# Patient Record
Sex: MALE | Race: WHITE | Employment: OTHER | URBAN - METROPOLITAN AREA
[De-identification: names, ages, dates, MRNs, and addresses within clinical notes are randomized per-mention and may not be internally consistent; named-entity substitution may affect disease eponyms.]

---

## 2018-12-03 ENCOUNTER — HOSPITAL ENCOUNTER (INPATIENT)
Facility: HOSPITAL | Age: 78
LOS: 3 days | Discharge: NURSING, REHAB OR LONG-TERM CARE | DRG: 683 | End: 2018-12-07
Attending: EMERGENCY MEDICINE | Admitting: INTERNAL MEDICINE
Payer: MEDICARE

## 2018-12-03 ENCOUNTER — APPOINTMENT (OUTPATIENT)
Dept: GENERAL RADIOLOGY | Facility: HOSPITAL | Age: 78
DRG: 683 | End: 2018-12-03
Payer: MEDICARE

## 2018-12-03 DIAGNOSIS — L02.212 ABSCESS OF BACK: ICD-10-CM

## 2018-12-03 DIAGNOSIS — N17.9 AKI (ACUTE KIDNEY INJURY) (HCC): Primary | ICD-10-CM

## 2018-12-03 DIAGNOSIS — E86.0 DEHYDRATION: ICD-10-CM

## 2018-12-03 DIAGNOSIS — E87.0 HYPERNATREMIA: ICD-10-CM

## 2018-12-03 PROCEDURE — 71045 X-RAY EXAM CHEST 1 VIEW: CPT | Performed by: EMERGENCY MEDICINE

## 2018-12-04 PROBLEM — L02.212 ABSCESS OF BACK: Status: ACTIVE | Noted: 2018-12-04

## 2018-12-04 PROBLEM — E86.0 DEHYDRATION: Status: ACTIVE | Noted: 2018-12-04

## 2018-12-04 PROBLEM — N17.9 AKI (ACUTE KIDNEY INJURY) (HCC): Status: ACTIVE | Noted: 2018-12-04

## 2018-12-04 PROBLEM — E87.0 HYPERNATREMIA: Status: ACTIVE | Noted: 2018-12-04

## 2018-12-04 PROCEDURE — 0W9K0ZZ DRAINAGE OF UPPER BACK, OPEN APPROACH: ICD-10-PCS | Performed by: EMERGENCY MEDICINE

## 2018-12-04 PROCEDURE — 99223 1ST HOSP IP/OBS HIGH 75: CPT | Performed by: INTERNAL MEDICINE

## 2018-12-04 RX ORDER — ATORVASTATIN CALCIUM 10 MG/1
10 TABLET, FILM COATED ORAL NIGHTLY
Status: DISCONTINUED | OUTPATIENT
Start: 2018-12-04 | End: 2018-12-07

## 2018-12-04 RX ORDER — FAMOTIDINE 40 MG/1
40 TABLET, FILM COATED ORAL DAILY
Status: DISCONTINUED | OUTPATIENT
Start: 2018-12-04 | End: 2018-12-07

## 2018-12-04 RX ORDER — GLIMEPIRIDE 1 MG/1
1 TABLET ORAL
COMMUNITY

## 2018-12-04 RX ORDER — SERTRALINE HYDROCHLORIDE 25 MG/1
25 TABLET, FILM COATED ORAL DAILY
Status: DISCONTINUED | OUTPATIENT
Start: 2018-12-04 | End: 2018-12-07

## 2018-12-04 RX ORDER — SODIUM CHLORIDE 9 MG/ML
INJECTION, SOLUTION INTRAVENOUS CONTINUOUS
Status: ACTIVE | OUTPATIENT
Start: 2018-12-04 | End: 2018-12-04

## 2018-12-04 RX ORDER — SODIUM CHLORIDE 9 MG/ML
INJECTION, SOLUTION INTRAVENOUS CONTINUOUS
Status: DISCONTINUED | OUTPATIENT
Start: 2018-12-04 | End: 2018-12-04

## 2018-12-04 RX ORDER — SERTRALINE HYDROCHLORIDE 25 MG/1
25 TABLET, FILM COATED ORAL DAILY
COMMUNITY

## 2018-12-04 RX ORDER — METHYLPHENIDATE HYDROCHLORIDE 5 MG/1
2.5 TABLET ORAL DAILY
Status: DISCONTINUED | OUTPATIENT
Start: 2018-12-04 | End: 2018-12-07

## 2018-12-04 RX ORDER — DEXTROSE MONOHYDRATE 25 G/50ML
50 INJECTION, SOLUTION INTRAVENOUS AS NEEDED
Status: DISCONTINUED | OUTPATIENT
Start: 2018-12-04 | End: 2018-12-07

## 2018-12-04 RX ORDER — LISINOPRIL 10 MG/1
10 TABLET ORAL DAILY
Status: DISCONTINUED | OUTPATIENT
Start: 2018-12-04 | End: 2018-12-07

## 2018-12-04 RX ORDER — ONDANSETRON 2 MG/ML
4 INJECTION INTRAMUSCULAR; INTRAVENOUS EVERY 4 HOURS PRN
Status: DISCONTINUED | OUTPATIENT
Start: 2018-12-04 | End: 2018-12-07

## 2018-12-04 RX ORDER — DEXTROSE AND SODIUM CHLORIDE 5; .45 G/100ML; G/100ML
INJECTION, SOLUTION INTRAVENOUS CONTINUOUS
Status: DISCONTINUED | OUTPATIENT
Start: 2018-12-04 | End: 2018-12-04

## 2018-12-04 RX ORDER — SODIUM CHLORIDE 450 MG/100ML
INJECTION, SOLUTION INTRAVENOUS CONTINUOUS
Status: DISCONTINUED | OUTPATIENT
Start: 2018-12-04 | End: 2018-12-07

## 2018-12-04 RX ORDER — 0.9 % SODIUM CHLORIDE 0.9 %
VIAL (ML) INJECTION
Status: COMPLETED
Start: 2018-12-04 | End: 2018-12-04

## 2018-12-04 RX ORDER — METHYLPHENIDATE HYDROCHLORIDE 5 MG/1
2.5 TABLET ORAL DAILY
Status: ON HOLD | COMMUNITY
End: 2019-03-28

## 2018-12-04 RX ORDER — ONDANSETRON 2 MG/ML
4 INJECTION INTRAMUSCULAR; INTRAVENOUS EVERY 6 HOURS PRN
Status: DISCONTINUED | OUTPATIENT
Start: 2018-12-04 | End: 2018-12-07

## 2018-12-04 RX ORDER — SIMVASTATIN 20 MG
20 TABLET ORAL NIGHTLY
COMMUNITY

## 2018-12-04 RX ORDER — LISINOPRIL 10 MG/1
10 TABLET ORAL DAILY
COMMUNITY

## 2018-12-04 RX ORDER — ALFUZOSIN HYDROCHLORIDE 10 MG/1
10 TABLET, EXTENDED RELEASE ORAL DAILY
Status: DISCONTINUED | OUTPATIENT
Start: 2018-12-04 | End: 2018-12-07

## 2018-12-04 RX ORDER — HEPARIN SODIUM 5000 [USP'U]/ML
5000 INJECTION, SOLUTION INTRAVENOUS; SUBCUTANEOUS EVERY 12 HOURS SCHEDULED
Status: DISCONTINUED | OUTPATIENT
Start: 2018-12-04 | End: 2018-12-07

## 2018-12-04 RX ORDER — TAMSULOSIN HYDROCHLORIDE 0.4 MG/1
0.4 CAPSULE ORAL DAILY
COMMUNITY

## 2018-12-04 RX ORDER — ACETAMINOPHEN 325 MG/1
650 TABLET ORAL EVERY 6 HOURS PRN
Status: DISCONTINUED | OUTPATIENT
Start: 2018-12-04 | End: 2018-12-07

## 2018-12-04 RX ORDER — FAMOTIDINE 40 MG/1
40 TABLET, FILM COATED ORAL DAILY
COMMUNITY

## 2018-12-04 NOTE — ED NOTES
Patient's wife PAULINA EVANS, [phone], left this RN will call and leave message with room number. Per wife patient medications are at [facility], [address]

## 2018-12-04 NOTE — PROGRESS NOTES
[facility] Pharmacy Note:  Therapeutic Interchange    Lucie Becker was previously taking tamsulosin 0.4 mg PO q24h at home prior to admission. Per therapeutic interchange, the patient will be switched to alfuzosin 10 mg PO q24h during hospitalization.     Ginette Deluca

## 2018-12-04 NOTE — ED NOTES
Patient's family states patient has been weak for the past week, not being able to change his own depends, not being able to transfer into/out of wheelchair. Loss of appetite. Patient has hx of previous stroke and right sided weakness.

## 2018-12-04 NOTE — ED PROVIDER NOTES
Patient Seen in: [facility] AND [facility] Emergency Department    History   Patient presents with:  Dehydration (metabolic/constitutional)      HPI    Patient presents to the ED with his wife for poor p.o. intake for the past 2 weeks.   Wife states patient has n Left eye exhibits no discharge. Neck: No tracheal deviation present. Cardiovascular: Normal rate and intact distal pulses. Pulmonary/Chest: Effort normal and breath sounds normal. No stridor. No respiratory distress. Abdominal: Soft.  He exhibits no Abnormality         Status                     ---------                               -----------         ------                     CBC W/ DIFFERENTIAL[[id number]]          Abnormal            Final result                 Please view resu abscess that was drained by myself, patient will need inpatient care for ongoing symptoms. Discussed with Dr. Hayes Leslie for admission and Dr. Wesly Henley for renal consult. Procedure:  Abscess drainage:   The patient's abscess was located to his central upper

## 2018-12-04 NOTE — PROGRESS NOTES
[facility] Pharmacy Note:  Therapeutic Interchange    Santiago Mckee was previously taking simvastatin 20 mg PO q24h at home prior to admission. Per therapeutic interchange, the patient will be switched to atorvastatin 10 mg PO q24h during hospitalization.

## 2018-12-04 NOTE — SLP NOTE
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 Per Pt, currently assisted with meals at home of \"softer foods\" and thin liquids with no straw use.  Per Pt, no swallowin be to ensure safe tolerance of NEWLY initiated diet and to reinforce all swallowing precautions/strategies to improve safety/efficiency of the swallow. CXR results to be monitored closely.  Will complete VFSS as appropriate (if CXR declines/no improvement a Unlabored  Consistencies Trialed: Thin liquids; Nectar thick liquids; Hard solid  Method of Presentation: Staff/Clinician assistance;Cup  Patient Positioning: Upright;Midline    Oral Phase of Swallow: Impaired  Bolus Retrieval: Intact  Bilabial Seal: Intact

## 2018-12-04 NOTE — ED NOTES
Pt from home, difficulty ambulating, decreased appetite, patient normally incontinent. Hx cva (right side residual). Wife at bedside. Fluids running. Vitals up to date. Patient answering this RN's questions appropriately such as birthday and year.

## 2018-12-04 NOTE — ED NOTES
Patient answering questions appropriately. Per wife he hasnt been eating as much lately and also she hasnt been giving his metformin since he does not like the taste.

## 2018-12-04 NOTE — ED INITIAL ASSESSMENT (HPI)
Family reports pt has had poor PO intake x 2 weeks and has not been eating or drinking. Pt has hx of CVA with right sided residual weakness. Pt denies cp or dizziness.  +SOB

## 2018-12-04 NOTE — H&P
[facility]    History and Physical    [address] Patient Status:  Inpatient    10/27/1940 MRN [id number]   Location [facility] 5SW/SE Attending Jose D Flores MD   Hosp Day # 0 PCP No primary care provider on file.      Mike Parikh mouth daily. Review of Systems:     Constitutional: Negative. HENT: Negative. Eyes: Negative. Respiratory: Negative. Cardiovascular: Negative. Gastrointestinal: Negative. Endocrine: Negative. Genitourinary: Negative.     [address] [facility] USHA (acute kidney injury) started on fluids,  Renal on case  Dehydration on fluids  UTI on abx  DM monitor accucheck  HLD on statin  CVA with h/o weakness  Abscess s/p drainage        PHYSICIAN Certification of Need for Inpatient Hospitalization - Init

## 2018-12-04 NOTE — ED NOTES
Orders for admission, patient is aware of plan, and ready to go upstairs. Any questions, please call ED RN [phone].

## 2018-12-05 ENCOUNTER — APPOINTMENT (OUTPATIENT)
Dept: ULTRASOUND IMAGING | Facility: HOSPITAL | Age: 78
DRG: 683 | End: 2018-12-05
Attending: INTERNAL MEDICINE
Payer: MEDICARE

## 2018-12-05 PROCEDURE — 99233 SBSQ HOSP IP/OBS HIGH 50: CPT | Performed by: INTERNAL MEDICINE

## 2018-12-05 PROCEDURE — 76770 US EXAM ABDO BACK WALL COMP: CPT | Performed by: INTERNAL MEDICINE

## 2018-12-05 RX ORDER — POTASSIUM CHLORIDE 20 MEQ/1
40 TABLET, EXTENDED RELEASE ORAL ONCE
Status: DISCONTINUED | OUTPATIENT
Start: 2018-12-05 | End: 2018-12-05

## 2018-12-05 RX ORDER — ARIPIPRAZOLE 15 MG/1
40 TABLET ORAL ONCE
Status: COMPLETED | OUTPATIENT
Start: 2018-12-05 | End: 2018-12-06

## 2018-12-05 NOTE — PLAN OF CARE
Problem: Patient Centered Care  Goal: Patient preferences are identified and integrated in the patient's plan of care  Interventions:  - What would you like us to know as we care for you?  I live with my wife  - Provide timely, complete, and accurate inform

## 2018-12-05 NOTE — CONSULTS
[facility]    Report of Consultation    Raymundo Dawkins Patient Status:  Inpatient    10/27/1940 MRN [id number]   Location [facility] 5SW/SE Attending Vern Cortes MD   Hosp Day # 1 PCP No primary care provider on file. medical: Not on file      Transportation needs - non-medical: Not on file    Occupational History      Not on file    Tobacco Use      Smoking status: Never Smoker      Smokeless tobacco: Never Used    Substance and Sexual Activity      Alcohol use:  No daily.   simvastatin 20 MG Oral Tab Take 20 mg by mouth nightly. famotidine 40 MG Oral Tab Take 40 mg by mouth daily. tamsulosin HCl 0.4 MG Oral Cap Take 0.4 mg by mouth daily. Methylphenidate HCl 5 MG Oral Tab Take 2.5 mg by mouth daily.        Chayito Shaffer sounds normal   Extremities: extremities normal, no edema  Pulses: pedal pulses palpable  Skin: No rashes or lesions  Lymph nodes: Cervical, supraclavicular normal.  Neurologic:R hemiparesis. Austin Master  Speech garbled  Psychiatric: calm    Results:     Laboratory Da

## 2018-12-05 NOTE — DIETARY NOTE
ADULT NUTRITION INITIAL ASSESSMENT    Pt is at high nutrition risk. Pt meets malnutrition criteria.       CRITERIA FOR MALNUTRITION DIAGNOSIS:  Criteria for severe malnutrition diagnosis: chronic illness related to wt loss greater than 20% in 1 year, energ with ordering meals, tray set up and/or feeding as needed  - Meals and snacks: see RD malnutrition care plan above  - Medical Food Supplements-RD added ONS TID to provide 790 kcal and 27 g protein daily. Rational/use of oral supplements discussed.   - Vitam noted IV fluids 0.45 NS provides 2L fluids daily.    • famotidine  40 mg Oral Daily   • lisinopril  10 mg Oral Daily   • Methylphenidate HCl  2.5 mg Oral Daily   • Sertraline HCl  25 mg Oral Daily   • atorvastatin  10 mg Oral Nightly   • Alfuzosin HCl ER  1 Anthropometric Measurement:      Monitor: wt and wt change  - Nutrition Goals:      gradual wt gain as able, PO greater than 50% of meals, good supplement intake, euglycemia, prevent skin breakdown, support wound healing and labs WNL (K, P, Mg- refeeding r

## 2018-12-05 NOTE — SLP NOTE
SPEECH DAILY NOTE - INPATIENT    ASSESSMENT & PLAN   ASSESSMENT  Pt seen for ongoing dysphagia therapy per recommendations of BSSE on 12/4/18. No family present in the room.  RN reports pt tolerates medication crushed in pureed, mechanical soft solids, and signs or symptoms of aspiration with 100 % accuracy over 2 session(s).    Pt tolerates mechanical soft solids with no overt CSA for 100% of trials. Pt with overt CSA with NTL via cup. Trials d/c.  Pt tolerates nectar thickened liquids via TSP with no overt

## 2018-12-05 NOTE — PHYSICAL THERAPY NOTE
PHYSICAL THERAPY EVALUATION - INPATIENT     Room Number: 548/548-A  Evaluation Date: 12/5/2018  Type of Evaluation: Initial   Physician Order: PT Eval and Treat    Presenting Problem: Dehydration, weakness, USHA  Reason for Therapy: Mobility Dysfunction an training;Transfer training;Balance training  Rehab Potential : Fair  Frequency (Obs): 5x/week       PHYSICAL THERAPY MEDICAL/SOCIAL HISTORY     History related to current admission:  Patient presents with:  Dehydration (metabolic/constitutional)     77 yea risk    WEIGHT BEARING RESTRICTION; None        PAIN ASSESSMENT  Ratin          COGNITION  · Overall Cognitive Status:  WFL - within functional limits    RANGE OF MOTION AND STRENGTH ASSESSMENT  Upper extremity ROM and strength are within functional li functional able to care for me better with my wife   Goal #1 Patient is able to demonstrate supine - sit EOB @ level: minimum assistance     Goal #1   Current Status    Goal #2 Patient is able to demonstrate transfers Sit to/from Stand at assistance level:

## 2018-12-05 NOTE — OCCUPATIONAL THERAPY NOTE
OCCUPATIONAL THERAPY EVALUATION - INPATIENT     Room Number: 548/548-A  Evaluation Date: 12/5/2018  Type of Evaluation: Initial  Presenting Problem: (weakness, poor intake )    Physician Order: IP Consult to Occupational Therapy  Reason for Therapy: ADL/IA Pt is below baseline = 2 person transfer, max for lb adls, poor sitting and standing balance, fall risk, incontinent, back abscess and will require MAMADOU at discharge to maximize ability for him to manage at home with wife - alarm set on chair - RN aware and not in the best health\"     OCCUPATIONAL THERAPY EXAMINATION      OBJECTIVE  Precautions: (right maldonado)       PAIN ASSESSMENT  Rating: (no pain noted )          ACTIVITY TOLERANCE  Poor  Poor food intake/weakness               COGNITION  Alert and O x 2  I ASSESSMENT  Grooming: min a   Feeding: min a   Bathing: max a   Toileting: max a - pt is incontinent   Upper Extremity Dressing: NT  Lower Extremity Dressing: max a       Patient End of Session: Up in chair;Needs met;Call light within reach;RN aware of ses

## 2018-12-06 ENCOUNTER — TELEPHONE (OUTPATIENT)
Dept: NEPHROLOGY | Facility: CLINIC | Age: 78
End: 2018-12-06

## 2018-12-06 PROCEDURE — 99232 SBSQ HOSP IP/OBS MODERATE 35: CPT | Performed by: INTERNAL MEDICINE

## 2018-12-06 NOTE — PLAN OF CARE
Diabetes/Glucose Control    • Glucose maintained within prescribed range Progressing        PAIN - ADULT    • Verbalizes/displays adequate comfort level or patient's stated pain goal Progressing        Patient Centered Care    • Patient preferences are erica

## 2018-12-06 NOTE — CM/SW NOTE
12/06/18 1500   CM/SW Referral Data   Referral Source Physician   Reason for Referral Discharge planning   Social History   Recreational Drug/Alcohol Use (History of TIA and Multiple stroke in 204, and 2008)   Patient Info   Advanced directives?  No   Pa

## 2018-12-06 NOTE — PROGRESS NOTES
[facility] - [facility]    Progress Note    [address] Patient Status:  Inpatient    10/27/1940 MRN [id number]   Location [facility] 5SW/SE Attending Sylvia Munoz MD   [facility] Day # 1 PCP No primary care provider on file.        Subject present, no abnormal bruising noted  Back/Spine: no abnormalities noted  Musculoskeletal: full ROM all extremities good strength  no deformities  Extremities: ok  Neurological:  r hemiparesis    Results:     Laboratory Data:  Lab Results   Component Value

## 2018-12-06 NOTE — PHYSICAL THERAPY NOTE
PHYSICAL THERAPY TREATMENT NOTE - INPATIENT     Room Number: 548/548-A       Presenting Problem: weakness, dehydration->UTI    Problem List  Principal Problem:    USHA (acute kidney injury) ([facility] [address]  Active Problems:    Dehydration    Abscess of back    Hypern Static Sitting: Fair -  Dynamic Sitting: Poor +           Static Standing: Poor -  Dynamic Standing: Dependent    ACTIVITY TOLERANCE                         O2 WALK                  AM-PAC support R UE   Goal #2  Current Status Mod A of 2   Goal #3 Patient is able to ambulate 3 feet with assist device: walker - rolling Platform support R UE at assistance level: maximum assistance   Goal #3   Current Status Unable to successfully ambulate;  Sandi Lewis

## 2018-12-06 NOTE — PAYOR COMM NOTE
--------------  ADMISSION REVIEW     Payor: [facility] [place] #:  [id number]  Authorization Number: [id number]    Admit date: 12/4/18  Admit time: 0423         Patient Seen in: [facility] Emergency Department    History   Patient draining. No surrounding erythema. Psychiatric: He has a normal mood and affect. His behavior is normal.   Nursing note and vitals reviewed.       ED Course        Labs Reviewed   BASIC METABOLIC PANEL (8) - Abnormal; Notable for the following components of bleeding, pain and worsening of the condition. The abscess was already spontaneously draining centrally, area enlarged with 11 blade scalpel, a large amount of purulent drainage was expressed. I irrigated and dressed the wound.   The patient tolerated height 5' 6\" (1.676 m), weight 117 lb 9.6 oz (53.3 kg), SpO2 97 %. Nursing note and vitals reviewed. Constitutional: He appears well-developed. HENT:   Head: Normocephalic. Eyes: Pupils are equal, round, and reactive to light.    Neck: Normal rang

## 2018-12-07 VITALS
RESPIRATION RATE: 16 BRPM | DIASTOLIC BLOOD PRESSURE: 40 MMHG | OXYGEN SATURATION: 94 % | BODY MASS INDEX: 19.71 KG/M2 | HEIGHT: 66 IN | WEIGHT: 122.63 LBS | SYSTOLIC BLOOD PRESSURE: 100 MMHG | TEMPERATURE: 98 F | HEART RATE: 75 BPM

## 2018-12-07 PROBLEM — N17.9 AKI (ACUTE KIDNEY INJURY) (HCC): Status: RESOLVED | Noted: 2018-12-04 | Resolved: 2018-12-07

## 2018-12-07 PROBLEM — E87.0 HYPERNATREMIA: Status: RESOLVED | Noted: 2018-12-04 | Resolved: 2018-12-07

## 2018-12-07 PROBLEM — E86.0 DEHYDRATION: Status: RESOLVED | Noted: 2018-12-04 | Resolved: 2018-12-07

## 2018-12-07 PROBLEM — L02.212 ABSCESS OF BACK: Status: RESOLVED | Noted: 2018-12-04 | Resolved: 2018-12-07

## 2018-12-07 PROCEDURE — 99232 SBSQ HOSP IP/OBS MODERATE 35: CPT | Performed by: INTERNAL MEDICINE

## 2018-12-07 RX ORDER — MAGNESIUM SULFATE HEPTAHYDRATE 40 MG/ML
2 INJECTION, SOLUTION INTRAVENOUS ONCE
Status: COMPLETED | OUTPATIENT
Start: 2018-12-07 | End: 2018-12-07

## 2018-12-07 NOTE — SLP NOTE
SPEECH DAILY NOTE - INPATIENT    ASSESSMENT & PLAN   ASSESSMENT  Patient seen in f/u for tolerance of dysphagia diet. RN reports patient adhering to tsp intakes of nectar-thick liquids with breakfast this a.m. Patient was repositioned upright.   Patient rate;Alternate consistencies;Small bites and sips  Aspiration Precautions: Upright position; Slow rate;Small bites and sips; No straw  Medication Administration Recommendations: One pill at a time; Whole in puree    Patient Experiencing Pain: No        Discha  In Progress        FOLLOW UP  Follow Up Needed: Yes - VFSS  SLP Follow-up Date: 12/08/18  Number of Visits to Meet Established Goals: 4    Session: 3 following BSSE    If you have any questions, please contact Nic Pan

## 2018-12-07 NOTE — CM/SW NOTE
12/07/18 1500   Discharge disposition   Expected discharge disposition Skilled Nurs   Name of [address]   Patient is Discharged to a [address] Yes   Discharge transportation Other (comment)    Patient to be d

## 2018-12-07 NOTE — CM/SW NOTE
Patients spouse called and asked for change in location for [facility] facility. [facility] prefers [facility]      Nurse to call report mark [phone] ; Ambulance to  patient at 5pm today.     / to remain available

## 2018-12-07 NOTE — PLAN OF CARE
Problem: Patient Centered Care  Goal: Patient preferences are identified and integrated in the patient's plan of care  Interventions:  - What would you like us to know as we care for you?  I live with my wife  - Provide timely, complete, and accurate inform non-pharmacological measures as appropriate and evaluate response  - Consider cultural and social influences on pain and pain management  - Manage/alleviate anxiety  - Utilize distraction and/or relaxation techniques  - Monitor for opioid side effects  - N

## 2018-12-07 NOTE — PAYOR COMM NOTE
12/5/18.          Objective:   Blood pressure 125/64, pulse 66, temperature 97.2 °F (36.2 °C), temperature source Oral, resp. rate 16, height 5' 6\" (1.676 m), weight 117 lb 9.6 oz (53.3 kg), SpO2 97 %.     Nursing note and vitals reviewed.    Constitutiona   WBC 10.8 12/06/2018     HGB 10.4 (L) 12/06/2018     HCT 32.3 (L) 12/06/2018      (L) 12/06/2018     CREATSERUM 1.15 12/06/2018     BUN 57 (H) 12/06/2018      12/06/2018     K 4.4 12/06/2018     K 4.4 12/06/2018      (H) 12/06/2018 Bag (none) Intravenous     12/6/2018 [address]3 [address] Bag (none) Intravenous

## 2018-12-07 NOTE — SLP NOTE
SPEECH DAILY NOTE - INPATIENT    ASSESSMENT & PLAN   ASSESSMENT  Per RN, pt is tolerating current diet without overt clinical signs or symptoms of aspiration. Pt seen sitting upright in bed with dinner tray to monitor tolerance of current diet.  Family pres demonstrate understanding and implementation of aspiration precautions and swallow strategies independently over 4 session(s). SLP reviewed aspiration precautions and swallow strategies with the patient and family.  Family and patient verbalized Linda

## 2018-12-07 NOTE — PROGRESS NOTES
[facility]    Progress Note    [address] Patient Status:  Inpatient    10/27/1940 MRN [id number]   Location [facility] 5SW/SE Attending Elvia Hatch MD   [address] Day # 3 PCP No primary care provider on file.         Subj

## 2018-12-07 NOTE — DISCHARGE SUMMARY
[place] [facility] - [facility]    Discharge Summary    Chrissy Lewis Patient Status:  Inpatient    10/27/1940 MRN [id number]   Location [facility] 5SW/SE Attending Torito Hernandez MD   [address] Day # 3 PCP No primary care provider on file.      Date

## 2018-12-07 NOTE — PROGRESS NOTES
[facility]    Progress Note    [address] Patient Status:  Inpatient    10/27/1940 MRN [id number]   Location [facility] 5SW/SE Attending Arely MD Davon   [facility] Day # 2 PCP No primary care provider on file.        Subject normal  Skin/Hair: no unusual rashes present, no abnormal bruising noted  Back/Spine: no abnormalities noted  Musculoskeletal: full ROM all extremities good strength  no deformities  Extremities: no edema, cyanosis  Neurological:  R hemiparesis    Results:

## 2018-12-08 NOTE — PROGRESS NOTES
[facility]    Progress Note    [address] Patient Status:  Inpatient    10/27/1940 MRN [id number]   Location [facility] 5SW/SE Attending No att. providers found   [address] Day # 3 PCP No primary care provider on file.        Halina Chute normal  Skin/Hair: no unusual rashes present, no abnormal bruising noted  Back/Spine: no abnormalities noted  Musculoskeletal: full ROM all extremities good strength  no deformities  Extremities: no edema, cyanosis  Neurological:  RIGHT HEMIPARESIS  (OLD)

## 2019-03-22 ENCOUNTER — HOSPITAL ENCOUNTER (INPATIENT)
Facility: HOSPITAL | Age: 79
LOS: 6 days | Discharge: NURSING, REHAB OR LONG-TERM CARE | DRG: 683 | End: 2019-03-28
Attending: EMERGENCY MEDICINE | Admitting: HOSPITALIST
Payer: MEDICARE

## 2019-03-22 DIAGNOSIS — N30.01 ACUTE CYSTITIS WITH HEMATURIA: Primary | ICD-10-CM

## 2019-03-22 PROCEDURE — 82962 GLUCOSE BLOOD TEST: CPT

## 2019-03-22 PROCEDURE — 83036 HEMOGLOBIN GLYCOSYLATED A1C: CPT | Performed by: HOSPITALIST

## 2019-03-22 PROCEDURE — 87086 URINE CULTURE/COLONY COUNT: CPT | Performed by: EMERGENCY MEDICINE

## 2019-03-22 PROCEDURE — 80048 BASIC METABOLIC PNL TOTAL CA: CPT | Performed by: EMERGENCY MEDICINE

## 2019-03-22 PROCEDURE — 85025 COMPLETE CBC W/AUTO DIFF WBC: CPT | Performed by: EMERGENCY MEDICINE

## 2019-03-22 PROCEDURE — 96366 THER/PROPH/DIAG IV INF ADDON: CPT

## 2019-03-22 PROCEDURE — 81001 URINALYSIS AUTO W/SCOPE: CPT | Performed by: EMERGENCY MEDICINE

## 2019-03-22 PROCEDURE — 85060 BLOOD SMEAR INTERPRETATION: CPT | Performed by: EMERGENCY MEDICINE

## 2019-03-22 PROCEDURE — 99285 EMERGENCY DEPT VISIT HI MDM: CPT

## 2019-03-22 PROCEDURE — 96365 THER/PROPH/DIAG IV INF INIT: CPT

## 2019-03-22 RX ORDER — ACETAMINOPHEN 325 MG/1
650 TABLET ORAL EVERY 6 HOURS PRN
Status: DISCONTINUED | OUTPATIENT
Start: 2019-03-22 | End: 2019-03-28

## 2019-03-22 RX ORDER — DEXTROSE MONOHYDRATE 25 G/50ML
50 INJECTION, SOLUTION INTRAVENOUS AS NEEDED
Status: DISCONTINUED | OUTPATIENT
Start: 2019-03-22 | End: 2019-03-28

## 2019-03-22 RX ORDER — ONDANSETRON 2 MG/ML
4 INJECTION INTRAMUSCULAR; INTRAVENOUS EVERY 6 HOURS PRN
Status: DISCONTINUED | OUTPATIENT
Start: 2019-03-22 | End: 2019-03-28

## 2019-03-22 RX ORDER — METOCLOPRAMIDE HYDROCHLORIDE 5 MG/ML
10 INJECTION INTRAMUSCULAR; INTRAVENOUS EVERY 8 HOURS PRN
Status: DISCONTINUED | OUTPATIENT
Start: 2019-03-22 | End: 2019-03-28

## 2019-03-22 RX ORDER — SODIUM CHLORIDE 0.9 % (FLUSH) 0.9 %
3 SYRINGE (ML) INJECTION AS NEEDED
Status: DISCONTINUED | OUTPATIENT
Start: 2019-03-22 | End: 2019-03-28

## 2019-03-22 RX ORDER — HEPARIN SODIUM 5000 [USP'U]/ML
5000 INJECTION, SOLUTION INTRAVENOUS; SUBCUTANEOUS EVERY 8 HOURS SCHEDULED
Status: DISCONTINUED | OUTPATIENT
Start: 2019-03-23 | End: 2019-03-28

## 2019-03-23 ENCOUNTER — APPOINTMENT (OUTPATIENT)
Dept: CT IMAGING | Facility: HOSPITAL | Age: 79
DRG: 683 | End: 2019-03-23
Attending: INTERNAL MEDICINE
Payer: MEDICARE

## 2019-03-23 PROCEDURE — 74176 CT ABD & PELVIS W/O CONTRAST: CPT | Performed by: INTERNAL MEDICINE

## 2019-03-23 PROCEDURE — 82962 GLUCOSE BLOOD TEST: CPT

## 2019-03-23 PROCEDURE — 85025 COMPLETE CBC W/AUTO DIFF WBC: CPT | Performed by: HOSPITALIST

## 2019-03-23 PROCEDURE — 80048 BASIC METABOLIC PNL TOTAL CA: CPT | Performed by: HOSPITALIST

## 2019-03-23 RX ORDER — ATORVASTATIN CALCIUM 10 MG/1
10 TABLET, FILM COATED ORAL NIGHTLY
Status: DISCONTINUED | OUTPATIENT
Start: 2019-03-23 | End: 2019-03-28

## 2019-03-23 RX ORDER — 0.9 % SODIUM CHLORIDE 0.9 %
VIAL (ML) INJECTION
Status: COMPLETED
Start: 2019-03-23 | End: 2019-03-23

## 2019-03-23 RX ORDER — SODIUM CHLORIDE 9 MG/ML
INJECTION, SOLUTION INTRAVENOUS
Status: COMPLETED
Start: 2019-03-23 | End: 2019-03-23

## 2019-03-23 RX ORDER — SIMVASTATIN 20 MG
20 TABLET ORAL NIGHTLY
Status: DISCONTINUED | OUTPATIENT
Start: 2019-03-23 | End: 2019-03-23

## 2019-03-23 NOTE — PLAN OF CARE
Problem: SAFETY ADULT - FALL  Goal: Free from fall injury  INTERVENTIONS:  - Assess pt frequently for physical needs  - Identify cognitive and physical deficits and behaviors that affect risk of falls.   - [place] fall precautions as indicated by assessme

## 2019-03-23 NOTE — H&P
[facility] Hospitalist H&P       CC: Patient presents with:  Urinary Symptoms (urologic)     PCP: Lanre Villatoro NP    Date of Admission: 3/22/2019  9:05 PM    ASSESSMENT / PLAN:     Mr. Martinez Cristina is a 69 M with PMH of DM2, HTN, hx stroke with residual R fevers/chills. Has had 2 UTI in the past 6 months. Prior to that was several years before last UTI.  Uses diapers because he cannot get to the bathroom quickly and his wife does not like the smell of the urinals      PMH  Past Medical History:   Diagnosis D kg)   SpO2 95%   BMI 21.95 kg/m²     GEN: elderly male in NAD  HEENT: EOMI, PERRLA  Neck: Supple, no JVD  Pulm: CTAB, no crackles or wheezes  CV: RRR, no murmurs   ABD: Soft, non-tender, non-distended, +BS  MSK: RUE contracted, strength 5/5 LUE, LLE, 2/5 R

## 2019-03-23 NOTE — ED NOTES
Pt presents stating that he has a multi drug resistant UTI. Pt c/o pain with urination and recent antbx use for previous UTI. Pt denies fevers, hematuria, back pain.  Pt mentioned that he had a UA done outpatient and the culture came back today and he was i

## 2019-03-23 NOTE — ED PROVIDER NOTES
Patient Seen in: [facility] AND [facility] Emergency Department    History   Patient presents with:  Urinary Symptoms (urologic)    Stated Complaint: UTI    HPI    80-year-old male with history of diabetes, hypertension, previous stroke with residual right arm d headaches. All other systems reviewed and are negative. Positive for stated complaint: UTI  Other systems are as noted in HPI. Constitutional and vital signs reviewed. All other systems reviewed and negative except as noted above.     Physical PM   Result Value Ref Range    WBC 8.3 4.0 - 11.0 x10(3) uL    RBC 3.92 3.80 - 5.80 x10(6)uL    HGB 11.3 (L) 13.0 - 17.5 g/dL    HCT 36.5 (L) 39.0 - 53.0 %    MCV 93.1 80.0 - 100.0 fL    MCH 28.8 26.0 - 34.0 pg    MCHC 31.0 31.0 - 37.0 g/dL    RDW-SD 50.4 were considered based on the presenting problem including UTI, sepsis, pneumonia, viral syndrome.             Disposition and Plan     Clinical Impression:  Acute cystitis with hematuria  (primary encounter diagnosis)    Disposition:  Admit  3/22/2019 10:02

## 2019-03-23 NOTE — ED NOTES
Orders for admission, patient is aware of plan and ready to go upstairs.  Any questions, please call ED RN Garon Spurling  at extension [phone]

## 2019-03-23 NOTE — CONSULTS
[facility]    PATIENT'S NAME: ELVA EVANS   ATTENDING PHYSICIAN: Ananya Whyte. Belén Ryan MD   CONSULTING PHYSICIAN: Nicole Louis MD   PATIENT ACCOUNT#:   [id number]    LOCATION:  [address] [place] [address] Est #:   [id number]       DATE OF DATA:  Urinalysis, active sediment. Urine culture pending. White count 6.0, hemoglobin 9.9, platelets 333,896. BUN 26 and creatinine 1.17. There apparently is a high eosinophil count on the blood smear.     There is an ultrasound from December 2018 show

## 2019-03-23 NOTE — CONSULTS
Malik Perez is a 69year old male. Patient presents with:  Urinary Symptoms (urologic)      HPI:    >2 weeks dysuria not responsive to augmentin    REVIEW OF SYSTEMS:   A comprehensive 11 point review of systems was completed.   Pertinent positives an  03/23/2019    K 4.3 03/23/2019     03/23/2019    CO2 25.0 03/23/2019    GLU 60 03/23/2019    CA 8.4 03/23/2019        Pending Labs     Order Current Status    URINE CULTURE, ROUTINE In process           Results for orders placed or performed d morphology, including toxic granulation and vacuolization. Red blood cells demonstrate moderate anisopoikilocytosis with frequent acanthocytes. There are no other significant morphologic abnormalities in the other leukocyte subsets or platelets.     [place]iffe SCAN SLIDE   Result Value Ref Range    Slide Review Slide reviewed,morphology review added    POCT GLUCOSE   Result Value Ref Range    POC Glucose  75 70 - 99   POCT GLUCOSE   Result Value Ref Range    POC Glucose  76 70 - 99   POCT GLUCOSE   Result Va Lymphocyte Absolute 1.62 1.00 - 4.00 x10(3) uL    Monocyte Absolute 0.70 0.10 - 1.00 x10(3) uL    Eosinophil Absolute 0.93 (H) 0.00 - 0.70 x10(3) uL    Basophil Absolute 0.08 0.00 - 0.20 x10(3) uL    Immature Granulocyte Absolute 0.01 0.00 - 1.00 x10(3) uL

## 2019-03-24 PROCEDURE — 84550 ASSAY OF BLOOD/URIC ACID: CPT | Performed by: INTERNAL MEDICINE

## 2019-03-24 PROCEDURE — 80053 COMPREHEN METABOLIC PANEL: CPT | Performed by: INTERNAL MEDICINE

## 2019-03-24 PROCEDURE — 82962 GLUCOSE BLOOD TEST: CPT

## 2019-03-24 PROCEDURE — 85025 COMPLETE CBC W/AUTO DIFF WBC: CPT | Performed by: INTERNAL MEDICINE

## 2019-03-24 NOTE — PLAN OF CARE
Problem: Patient Centered Care  Goal: Patient preferences are identified and integrated in the patient's plan of care  Interventions:  - What would you like us to know as we care for you? Live at home with my wife. - Provide timely, complete, and accurate i devices as appropriate  - Consider OT/PT consult to assist with strengthening/mobility  - Encourage toileting schedule  Outcome: Progressing      Problem: RISK FOR INFECTION - ADULT  Goal: Absence of fever/infection during anticipated neutropenic period  I medications  - Encourage mobilization and activity  - Obtain nutritional consult as needed  - Establish a toileting routine/schedule  - Consider collaborating with pharmacy to review patient's medication profile  Outcome: Progressing    Goal: Maintains olga status, including labs, urine output, blood pressure (other measures as available)  - Encourage oral intake as appropriate  - Instruct patient on fluid and nutrition restrictions as appropriate  Outcome: Progressing      Problem: SKIN/TISSUE INTEGRITY - AD

## 2019-03-24 NOTE — PROGRESS NOTES
Naty Bhagat is a 69year old male. Patient presents with:  Urinary Symptoms (urologic)      HPI:    Ongoing dysuria;looks less ill today    REVIEW OF SYSTEMS:   A comprehensive 11 point review of systems was completed.   Pertinent positives and negati 185.0 03/24/2019    CREATSERUM 1.28 03/24/2019    BUN 26 03/24/2019     03/24/2019    K 4.6 03/24/2019     03/24/2019    CO2 22.0 03/24/2019    GLU 80 03/24/2019    CA 8.9 03/24/2019    ALB 2.8 03/24/2019    ALKPHO 56 03/24/2019    BILT 0.4 03/ Eosinophils are increased in number with reactive morphology. Neutrophils are normal in number but demonstrate toxic morphology, including toxic granulation and vacuolization.   Red blood cells demonstrate moderate anisopoikilocytosis with frequent acantho previous RBC morphology.     Platelet Morphology Normal Normal    Acanthocytes, Spur Cells 2+ (A)      Ovalocytes 2+ (A)     SCAN SLIDE   Result Value Ref Range    Slide Review Slide reviewed,morphology review added    PSA SCREEN   Result Value Ref Range Range    POC Glucose  104 (H) 70 - 99   POCT GLUCOSE   Result Value Ref Range    POC Glucose  81 70 - 99   POCT GLUCOSE   Result Value Ref Range    POC Glucose  164 (H) 70 - 99   RAINBOW DRAW BLUE   Result Value Ref Range    Hold Blue Auto Resulted    RAIN 4.00 x10(3) uL    Monocyte Absolute 0.70 0.10 - 1.00 x10(3) uL    Eosinophil Absolute 0.93 (H) 0.00 - 0.70 x10(3) uL    Basophil Absolute 0.08 0.00 - 0.20 x10(3) uL    Immature Granulocyte Absolute 0.01 0.00 - 1.00 x10(3) uL    Neutrophil % 44.1 %    Lymph

## 2019-03-24 NOTE — PLAN OF CARE
Problem: Patient Centered Care  Goal: Patient preferences are identified and integrated in the patient's plan of care  Interventions:  - What would you like us to know as we care for you?  Patient wants to be kept updated on POC  - Provide timely, complete, Provide assistive devices as appropriate  - Consider OT/PT consult to assist with strengthening/mobility  - Encourage toileting schedule  Outcome: Progressing      Problem: RISK FOR INFECTION - ADULT  Goal: Absence of fever/infection during anticipated len effectiveness of GI medications  - Encourage mobilization and activity  - Obtain nutritional consult as needed  - Establish a toileting routine/schedule  - Consider collaborating with pharmacy to review patient's medication profile  Outcome: Progressing for patient's volume status, including labs, urine output, blood pressure (other measures as available)  - Encourage oral intake as appropriate  - Instruct patient on fluid and nutrition restrictions as appropriate  Outcome: Progressing      Problem: SKIN/

## 2019-03-25 PROCEDURE — 80048 BASIC METABOLIC PNL TOTAL CA: CPT | Performed by: INTERNAL MEDICINE

## 2019-03-25 PROCEDURE — 80048 BASIC METABOLIC PNL TOTAL CA: CPT | Performed by: HOSPITALIST

## 2019-03-25 PROCEDURE — 81001 URINALYSIS AUTO W/SCOPE: CPT | Performed by: INTERNAL MEDICINE

## 2019-03-25 PROCEDURE — 85025 COMPLETE CBC W/AUTO DIFF WBC: CPT | Performed by: INTERNAL MEDICINE

## 2019-03-25 PROCEDURE — 82962 GLUCOSE BLOOD TEST: CPT

## 2019-03-25 PROCEDURE — 83690 ASSAY OF LIPASE: CPT | Performed by: INTERNAL MEDICINE

## 2019-03-25 PROCEDURE — 85027 COMPLETE CBC AUTOMATED: CPT | Performed by: HOSPITALIST

## 2019-03-25 NOTE — PROGRESS NOTES
[facility] Hospitalist Progress Note     CC: Hospital Follow up    PCP: Karis Jerome NP       Assessment/Plan:     Principal Problem:    Acute cystitis with hematuria    Mr. Estrada Henry is a 69 M with PMH of DM2, HTN, hx stroke with residual R sided weakne °C)-98 °F (36.7 °C)] 97.4 °F (36.3 °C)  Pulse:  [56-62] 56  Resp:  [16-18] 16  BP: (121-140)/(57-59) 140/57      Intake/Output:    Intake/Output Summary (Last 24 hours) at 3/25/2019 1201  Last data filed at 3/25/2019 1032  Gross per 24 hour   Intake 338 ml head of the pancreas which is probably accentuated by motion artifact although the findings are suggestive of acute pancreatitis. No pancreatic mass, pseudocyst or fluid collection. Correlation with pancreatic enzymes recommended.   No calcifications to s acetaminophen, ondansetron HCl, Metoclopramide HCl, dextrose, Glucose-Vitamin C, glucose

## 2019-03-25 NOTE — PAYOR COMM NOTE
--------------  ADMISSION REVIEW     Payor: [facility] [address] #:  [id number]  Authorization Number: N/A      REVIEW DOCUMENTATION:     ED Provider Notes             Patient Seen in: [facility] Emergency Department    History   Pa dysuria. Negative for flank pain and frequency. Musculoskeletal: Negative for back pain. Skin: Negative for rash. Neurological: Negative for weakness, light-headedness and headaches. All other systems reviewed and are negative.       Positive for st Calculated Osmolality 301 (H) 275 - 295 mOsm/kg    GFR, Non- 50 (L) >=60    GFR, -American 58 (L) >=60   CBC W/ DIFFERENTIAL    Collection Time: 03/22/19  9:24 PM   Result Value Ref Range    WBC 8.3 4.0 - 11.0 x10(3) uL    RBC 3.92 3 house    Patient and/or patient's family given opportunity to ask questions and note understanding and agreeing with therapeutic plan as outlined    Dale Collins MD  [facility] Hospitalist  Answering Service number: [phone]    HPI       History of Prese History    Tobacco Use      Smoking status: Never Smoker      Smokeless tobacco: Never Used    Alcohol use: No      Frequency: Never       Fam Hx  Family History   Problem Relation Age of Onset   • No Known Problems Father    • Other (macular degeneration) Electronically signed by Hernando Brennan MD on 3/23/2019  3:18 PM         MEDICATIONS ADMINISTERED IN LAST 1 DAY:  atorvastatin (LIPITOR) tab 10 mg     Date Action Dose Route User    3/24/2019 2131 Given 10 mg Oral Aroldo Hickey RN      H initiated with meropenem which I am in agreement with, pending a new culture. There is a history of ESBL and this certainly could be an ESBL infection. 2.       There is a history of bladder debris seen on an ultrasound done this past December.   Jg pancreas unremarkable. There is suggestion of mild edema at the pancreatic body and uncinate process. No visualized focal mass or fluid collection. No   significant pancreatic calcifications. ADRENALS:      Right adrenal negative.   Question of left adr approximately 53.4 mL  BONES:             Mild to moderate degenerative spondylosis thoracolumbar spine. No significant bony lesion or fracture.   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 Comparison with any previous outside abdominal CT studies suggested if possible. Appropriate for inpatient status per guidelines for UTI with known multiple resistances needing abx beyond observation period.

## 2019-03-25 NOTE — PLAN OF CARE
Problem: Patient Centered Care  Goal: Patient preferences are identified and integrated in the patient's plan of care  Interventions:  - What would you like us to know as we care for you? Live at home with my wife. - Provide timely, complete, and accurate i Instruct pt to call for assistance with activity based on assessment  - Modify environment to reduce risk of injury  - Provide assistive devices as appropriate  - Consider OT/PT consult to assist with strengthening/mobility  - Encourage toileting schedule nutritional consult as needed  - Evaluate fluid balance  Outcome: Completed Date Met: 03/25/19  No c/o NV; No IVF;  Tolerating PO intake well; BM yesterday   Goal: Maintains or returns to baseline bowel function  INTERVENTIONS:  - Assess bowel function  - M ordered  - Instruct patient on fluid and nutrition restrictions as appropriate  Outcome: Progressing  Monitoring electrolytes per protocol   Goal: Hemodynamic stability and optimal renal function maintained  INTERVENTIONS:  - Monitor labs and assess for si pathologist) if coughing or persistent throat clearing or wet/gurgly vocal quality is noted  Outcome: Progressing  Modified diet in place;  No coughing or signs of asp    Problem: PAIN - ADULT  Goal: Verbalizes/displays adequate comfort level or patient's s

## 2019-03-25 NOTE — PLAN OF CARE
Problem: Patient Centered Care  Goal: Patient preferences are identified and integrated in the patient's plan of care  Interventions:  - What would you like us to know as we care for you? Live at home with my wife. - Provide timely, complete, and accurate i Absence of fever/infection during anticipated neutropenic period  INTERVENTIONS  - Monitor WBC  - Administer growth factors as ordered  - Implement neutropenic guidelines  Outcome: Progressing

## 2019-03-25 NOTE — PROGRESS NOTES
[facility] AND [facility] Infectious Disease  Progress Note    Chrissy Lewis Patient Status:  Inpatient    10/27/1940 MRN [id number]   Location [facility] 5SW/SE Attending Jaspal Mcclendon MD   [facility] Day # 3 PCP GLENN Pereira dilatation. No intrahepatic ductal dilatation. 3. Distended bladder with nonspecific CT appearance. Mild to moderate prostatic enlargement with prostate volume 53.4 mL.   4. Moderate bilateral perinephric stranding consistent with sequela of acute or chr

## 2019-03-26 PROCEDURE — 97530 THERAPEUTIC ACTIVITIES: CPT

## 2019-03-26 PROCEDURE — 82962 GLUCOSE BLOOD TEST: CPT

## 2019-03-26 PROCEDURE — 97162 PT EVAL MOD COMPLEX 30 MIN: CPT

## 2019-03-26 PROCEDURE — 80048 BASIC METABOLIC PNL TOTAL CA: CPT | Performed by: HOSPITALIST

## 2019-03-26 PROCEDURE — 97166 OT EVAL MOD COMPLEX 45 MIN: CPT

## 2019-03-26 RX ORDER — ALFUZOSIN HYDROCHLORIDE 10 MG/1
10 TABLET, EXTENDED RELEASE ORAL
Status: DISCONTINUED | OUTPATIENT
Start: 2019-03-26 | End: 2019-03-28

## 2019-03-26 NOTE — PHYSICAL THERAPY NOTE
PHYSICAL THERAPY EVALUATION - INPATIENT     Room Number: 555/555-A  Evaluation Date: 3/26/2019  Type of Evaluation: Initial   Physician Order: PT Eval and Treat    Presenting Problem: acute cystitis w/hematuria, pain w/urination  Reason for Therapy: Mobil (therapist being like the R platform attachment) and he is able to take steps to chair.  He reports he is normally independent with bed mobility, sit to stand and transfer to w/c or commode and manages his own clothes as well at home in the morning, he need None    Prior Level of [place]: Patient reports he is normally able to manage his self cares and bed mobility and transfers in the morning.  He does not always put on the AFO but he can manage transfers with his R platform RW and pivot to w/c or commod Location: Right arm  BP Method: Automatic  Patient Position: Sitting    O2 WALK  SPO2 on Room Air at Rest: 96               AM-PAC '6-Clicks' [address] [place]  How much difficulty does the patient currently have. ..  -   Turning over in training    Patient End of Session: Up in chair;Needs met;Call light within reach;RN aware of session/findings; All patient questions and concerns addressed; Alarm set; Family present    CURRENT GOALS    Goals to be met by: 4/8/19  Patient Goal Patient's self

## 2019-03-26 NOTE — PLAN OF CARE
Problem: Diabetes/Glucose Control  Goal: Glucose maintained within prescribed range  INTERVENTIONS:  - Monitor Blood Glucose as ordered  - Assess for signs and symptoms of hyperglycemia and hypoglycemia  - Administer ordered medications to maintain glucose partner in discharge planning  - Arrange for needed discharge resources and transportation as appropriate  - Identify discharge learning needs (meds, wound care, etc)  - Arrange for interpreters to assist at discharge as needed  - Consider post-discharge p including labs, urine output, blood pressure (other measures as available)  - Encourage oral intake as appropriate  - Instruct patient on fluid and nutrition restrictions as appropriate  Outcome: Progressing      Problem: SKIN/TISSUE [address] cultural and social influences on pain and pain management  - Manage/alleviate anxiety  - Utilize distraction and/or relaxation techniques  - Monitor for opioid side effects  - Notify MD/LIP if interventions unsuccessful or patient reports new pain  - Anti

## 2019-03-26 NOTE — PROGRESS NOTES
[facility] Hospitalist Progress Note     CC: Hospital Follow up    PCP: Kwesi Rodriguez NP       Assessment/Plan:     Principal Problem:    Acute cystitis with hematuria    Mr. Neli Parker is a 69 M with PMH of DM2, HTN, hx stroke with residual R sided weakne temperature 97.6 °F (36.4 °C), temperature source Oral, resp. rate 16, height 165.1 cm (5' 5\"), weight 138 lb 9.6 oz (62.9 kg), SpO2 95 %.     Temp:  [97.3 °F (36.3 °C)-98.1 °F (36.7 °C)] 97.6 °F (36.4 °C)  Pulse:  [55-61] 57  Resp:  [16] 16  BP: (130-140) Abdomen+pelvis(cpt=74176)    Result Date: 3/23/2019  CONCLUSION:  1. Study limited by motion artifact.   There is edema at the body and head of the pancreas which is probably accentuated by motion artifact although the findings are suggestive of acute pancr Q12H   • atorvastatin  10 mg Oral Nightly   • Heparin Sodium (Porcine)  5,000 Units Subcutaneous Q8H [facility] & half-way   • Insulin Aspart Pen  1-5 Units Subcutaneous TID CC       Normal Saline Flush, acetaminophen, ondansetron HCl, Metoclopramide HCl, dextrose, Glucose-

## 2019-03-26 NOTE — OCCUPATIONAL THERAPY NOTE
OCCUPATIONAL THERAPY EVALUATION - INPATIENT     Room Number: 555/555-A  Evaluation Date: 3/26/2019  Type of Evaluation: Initial  Presenting Problem: acute cystitis    Physician Order: IP Consult to Occupational Therapy  Reason for Therapy: ADL/IADL Dysfunc (ae,platform walker).  Pending progress anticipate that pt will be able to return home w/ assist and [facility] as before     DISCHARGE RECOMMENDATIONS  OT Discharge Recommendations: Home;24 hour care/supervision;Home with home health PT/OT;Sub-acute rehabilitation STRENGTH ASSESSMENT  Left upper extremity strength is within functional limits;Rue strength is absent    COORDINATION  Gross Motor: WFL Lue  Fine Motor: WFL Lue    ACTIVITIES OF DAILY LIVING ASSESSMENT  AM-PAC ‘6-Clicks’ Inpatient Daily Activity Short

## 2019-03-26 NOTE — PROGRESS NOTES
[facility] AND CLINICS  [facility] Infectious Disease  Progress Note    Papi Lima Patient Status:  Inpatient    10/27/1940 MRN [id number]   Location [facility] 5SW/SE Attending Gisel Lewis MD   [facility] Day # 4 PCP GLENN Thomas appearance.  Mild to moderate prostatic enlargement with prostate volume 53.4 mL.   4. Moderate bilateral perinephric stranding consistent with sequela of acute or chronic inflammation or infection.  Limited noncontrast evaluation for pyelonephritis. [facility] to discharge given patient's debility. Stephanie Frank   [facility] Infectious Disease  [phone]    3/26/2019  2:51 PM

## 2019-03-26 NOTE — CONSULTS
[facility]    Report of Consultation    Magy Hackett Patient Status:  Inpatient    10/27/1940 MRN [id number]   Location [facility] 5SW/SE Attending Zack Clark MD   [address] Day # 4 PCP Mario Espinoza NP     Reason for Co Problems Father    • Other (macular degeneration) Mother    • No Known Problems Daughter    • No Known Problems Son    • Diabetes Sister    • No Known Problems Brother       reports that  has never smoked.  he has never used smokeless tobacco. He reports th (62.9 kg)   SpO2 99%   BMI 23.06 kg/m²   General appearance: alert, appears stated age, cooperative, no distress, resting in bed eating breakfast  Head: Normocephalic, without obvious abnormality, atraumatic  Eyes: conjunctivae and sclerae normal  Ears: sl at the pancreatic body and uncinate process. No visualized focal mass or fluid collection. No   significant pancreatic calcifications. ADRENALS:      Right adrenal negative.   Question of left adrenal fullness or small subcentimeter adrenal mass or nodul degenerative spondylosis thoracolumbar spine. No significant bony lesion or fracture. No acute osseous abnormality  LUNG BASES:  Small bilateral effusions with areas of compressive atelectasis at the lower lung field. Bibasilar atelectasis or scar.   No studies suggested if possible.     Assessment/Plan:  RECURRENT UTI  DYSURIA  Likely multifactorial  Dysuria is improving with abx treatment per patient  Continue abx per ID  Needs outpatient cystoscopy   Had planned to order pyridium but contraindicated wit

## 2019-03-27 ENCOUNTER — TELEPHONE (OUTPATIENT)
Dept: MEDSURG UNIT | Facility: HOSPITAL | Age: 79
End: 2019-03-27

## 2019-03-27 PROCEDURE — 80048 BASIC METABOLIC PNL TOTAL CA: CPT | Performed by: HOSPITALIST

## 2019-03-27 PROCEDURE — 97116 GAIT TRAINING THERAPY: CPT

## 2019-03-27 PROCEDURE — 97530 THERAPEUTIC ACTIVITIES: CPT

## 2019-03-27 PROCEDURE — 97535 SELF CARE MNGMENT TRAINING: CPT

## 2019-03-27 PROCEDURE — 82962 GLUCOSE BLOOD TEST: CPT

## 2019-03-27 PROCEDURE — 97110 THERAPEUTIC EXERCISES: CPT

## 2019-03-27 NOTE — PROGRESS NOTES
[facility] Hospitalist Progress Note     CC: Hospital Follow up    PCP: Klarissa French NP       Assessment/Plan:     Principal Problem:    Acute cystitis with hematuria    Mr. Devora Mejía is a 69 M with PMH of DM2, HTN, hx stroke with residual R sided weakne [phone]     Subjective:     Feels about the same. Wants to go to rehab    OBJECTIVE:    Blood pressure 125/47, pulse 55, temperature 98.8 °F (37.1 °C), temperature source Oral, resp.  rate 18, height 165.1 cm (5' 5\"), weight 134 lb 12.8 oz (61.1 kg), 111*  110*   CO2  23.0  26.0  26.0       Recent Labs   Lab  03/24/19   0656  03/24/19   0815   ALT  8*   --    AST   --   8*   ALB  2.8*   --          Imaging:          Meds:     • Alfuzosin HCl ER  10 mg Oral Daily with breakfast   • Vancomycin HCl  125 m

## 2019-03-27 NOTE — OCCUPATIONAL THERAPY NOTE
OCCUPATIONAL THERAPY TREATMENT NOTE - INPATIENT        Room Number: 555/555-A           Presenting Problem: acute cystitis    Problem List  Principal Problem:    Acute cystitis with hematuria      OCCUPATIONAL THERAPY ASSESSMENT     Pt was seen for OT omar SATURATIONS                ACTIVITIES OF DAILY LIVING ASSESSMENT  AM-PAC ‘6-Clicks’ Inpatient Daily Activity Short Form  How much help from another person does the patient currently need…  -   Putting on and taking off regular lower body clothing?: A Lot

## 2019-03-27 NOTE — PLAN OF CARE
Problem: Patient Centered Care  Goal: Patient preferences are identified and integrated in the patient's plan of care  Interventions:  - What would you like us to know as we care for you? Live at home with my wife. - Provide timely, complete, and accurate i Provide assistive devices as appropriate  - Consider OT/PT consult to assist with strengthening/mobility  - Encourage toileting schedule  Outcome: Progressing  Fall prec in place, pt does not get oob, call light in reach.     Problem: RISK FOR INFECTION - A electrolyte imbalances  - Administer electrolyte replacement as ordered  - Monitor response to electrolyte replacements, including rhythm and repeat lab results as appropriate  - Fluid restriction as ordered  - Instruct patient on fluid and nutrition restr Offer food and liquids at a slow rate  - No straws  - Encourage small bites of food and small sips of liquid  - Offer pills one at a time, crush or deliver with applesauce as needed  - Discontinue feeding and notify MD (or speech pathologist) if coughing o

## 2019-03-27 NOTE — PHYSICAL THERAPY NOTE
PHYSICAL THERAPY TREATMENT NOTE - INPATIENT     Room Number: 555/555-A       Presenting Problem: acute cystitis w/hematuria, pain w/urination    Problem List  Principal Problem:    Acute cystitis with hematuria      PHYSICAL THERAPY ASSESSMENT     Patient +  Dynamic Standing: Poor    ACTIVITY TOLERANCE                         O2 WALK                  AM-PAC '6-Clicks' INPATIENT SHORT FORM - BASIC MOBILITY  How much difficulty does the patient currently have. ..  -   Turning over in bed (including adjusting b chair with R platform RW and R AFO  at assistance level: modified independent   Goal #3   Current Status 3 ft with RW rt side platform with Mod A x 1-2   Goal #4    Goal #4   Current Status    Goal #5 Patient to demonstrate independence with home activity/

## 2019-03-27 NOTE — PROGRESS NOTES
[facility]    Progress Note    [address] [facility] Patient Status:  Inpatient    10/27/1940 MRN [id number]   Location [facility] 5SW/SE Attending Loretta Luevano MD   [facility] Day # 5 PCP Chayo Mcknight NP     Subjective:  Mushtaq Pitt

## 2019-03-27 NOTE — TELEPHONE ENCOUNTER
Patient seen at [place] for retention and recurrent UTI. He will be discharged with Zamorano catheter. Needs cystoscopy/voiding trial with MD next week. Routing to reception to schedule.

## 2019-03-27 NOTE — PLAN OF CARE
Pt seen by PT, recommend therapy , wife prefers a rehab also. Up with 2 assist and special walker to place contracted arm. Pt denies pain. Up to chair. Zamorano cath remains intact.     Diabetes/Glucose Control    • Glucose maintained within prescribed range P

## 2019-03-27 NOTE — PROGRESS NOTES
[facility] AND [facility] Infectious Disease  Progress Note    Shantell Lugo Patient Status:  Inpatient    10/27/1940 MRN [id number]   Location [facility] 5SW/SE Attending Demetrio Givens MD   [facility] Day # 5 PCP GLENN Guillory 53.4 mL.   4. Moderate bilateral perinephric stranding consistent with sequela of acute or chronic inflammation or infection.  Limited noncontrast evaluation for pyelonephritis.  Correlate clinically.  No focal suspicious mass or hydronephrosis.  Bilateral during this admission. Stephanie Chatterjee [facility] Infectious Disease  [phone]    3/27/2019  10:20 AM

## 2019-03-27 NOTE — CM/SW NOTE
SW met w/ pt to discuss d/c planning. Per pt, plan is for him to go to rehab, but unsure of what facility. Pt requested for SW to contact wife/Claudia. SW left VM for wife. SW left SNF list in pt's room. SW contacted [facility] for DON screen.     Jose Johnson L

## 2019-03-28 VITALS
TEMPERATURE: 98 F | HEART RATE: 56 BPM | RESPIRATION RATE: 18 BRPM | BODY MASS INDEX: 22.46 KG/M2 | WEIGHT: 134.81 LBS | OXYGEN SATURATION: 96 % | SYSTOLIC BLOOD PRESSURE: 138 MMHG | HEIGHT: 65 IN | DIASTOLIC BLOOD PRESSURE: 63 MMHG

## 2019-03-28 PROCEDURE — 82962 GLUCOSE BLOOD TEST: CPT

## 2019-03-28 PROCEDURE — 97530 THERAPEUTIC ACTIVITIES: CPT

## 2019-03-28 PROCEDURE — 97110 THERAPEUTIC EXERCISES: CPT

## 2019-03-28 PROCEDURE — 97116 GAIT TRAINING THERAPY: CPT

## 2019-03-28 PROCEDURE — 80048 BASIC METABOLIC PNL TOTAL CA: CPT | Performed by: HOSPITALIST

## 2019-03-28 RX ORDER — CIPROFLOXACIN 500 MG/1
500 TABLET, FILM COATED ORAL 2 TIMES DAILY
Qty: 14 TABLET | Refills: 0 | Status: SHIPPED | OUTPATIENT
Start: 2019-03-28 | End: 2019-04-04

## 2019-03-28 NOTE — PROGRESS NOTES
[facility] Hospitalist Progress Note     CC: Hospital Follow up    PCP: Gil Shearer NP       Assessment/Plan:     Principal Problem:    Acute cystitis with hematuria    Mr. Rachel Woodruff is a 69 M with PMH of DM2, HTN, hx stroke with residual R sided weakne understanding and agreeing with therapeutic plan as outlined  Alyssa Crowder MD  [facility] Hospitalist  Answering Service number: [phone]     Subjective:     No new complaints. Unable to reach wife to get SNF preferences.  Patient has been to [facility] Recent Labs   Lab  03/26/19   0719  03/27/19   0647  03/28/19   0645   GLU  93  103*  97   BUN  29*  32*  31*   CREATSERUM  1.17  1.19  1.21   GFRAA  69  68  66   GFRNAA  60  59*  57*   CA  9.5  8.6  8.9   NA  143  141  142   K  4.5  4.7  4.5   CL

## 2019-03-28 NOTE — PROGRESS NOTES
[facility] AND [facility] Infectious Disease Progress Note    Lucie Becker Patient Status:  Inpatient    10/27/1940 MRN [id number]   [facility] 5SW/SE Attending Jose M Velazquez MD   [facility] Day # 6 PCP Gabbie Silva NP     Subjective rhythm. No murmur. Abdomen:  Soft, non-distended, non-tender, with no rebound or guarding. No peritoneal signs. No ascites. Liver is within normal limits. Spleen is not palpable. Extremities:  No lower extremity edema noted.   Without clubbing or cya

## 2019-03-28 NOTE — PHYSICAL THERAPY NOTE
PHYSICAL THERAPY TREATMENT NOTE - INPATIENT     Room Number: 555/555-A       Presenting Problem: acute cystitis w/hematuria, pain w/urination    Problem List  Principal Problem:    Acute cystitis with hematuria      PHYSICAL THERAPY ASSESSMENT     Patient Sitting: Good  Dynamic Sitting: Fair +           Static Standing: Poor +  Dynamic Standing: Poor    ACTIVITY TOLERANCE                         O2 WALK                  AM-PAC '6-Clicks' INPATIENT SHORT FORM - BASIC MOBILITY  How much difficulty does the pa and R AFO     Goal #2  Current Status Min A x 2   Goal #3 Patient is able to transfer bed to/from chair with R platform RW and R AFO  at assistance level: modified independent   Goal #3   Current Status 10 ft with RW rt side platform with Mod A    Goal #4

## 2019-03-28 NOTE — CM/SW NOTE
SW left VM for wife/Claudia in regards to SNF choice. 1:40PM: SW received call from wife/Claudia. Marlene Layton requesting [facility] to [facility] [place]. Sw placed referral. Wife aware that pt is likely able to d/c today if approved.     2:20PM: [facility]

## 2019-03-28 NOTE — DISCHARGE SUMMARY
General Medicine Discharge Summary     Patient ID:  Thomas Rodriguez  69year old  10/27/1940    Admit date: 3/22/2019    Discharge date and time: 03/28/19    Attending Physician: Joaquin Kirby MD     Primary Care Physician: Gil Shearer NP     Re hold  - accuchecks QID, hypoglycemic protocol, SSI     Normocytic anemia  - Hg 11.3 on admit, down to 9.9 today, likely dilutional with fluids  - baseline 10-12  - monitor     HTN  - BP stable  - continue home lisinopril     GERD  - pepcid     Mesenteric c than 30 minutes    Patient had opportunity to ask questions and state understand and agree with therapeutic plan as outlined      Bigg Terry MD  [place] Hospitalist

## 2019-03-28 NOTE — PLAN OF CARE
Problem: Patient Centered Care  Goal: Patient preferences are identified and integrated in the patient's plan of care  Interventions:  - What would you like us to know as we care for you? Live at home with my wife. - Provide timely, complete, and accurate i devices as appropriate  - Consider OT/PT consult to assist with strengthening/mobility  - Encourage toileting schedule  Outcome: Progressing  Bed low and in locked position, call light within reach, nonskid socks applied    Problem: RISK FOR INFECTION - AD and symptoms of electrolyte imbalances  - Administer electrolyte replacement as ordered  - Monitor response to electrolyte replacements, including rhythm and repeat lab results as appropriate  - Fluid restriction as ordered  - Instruct patient on fluid and pills one at a time, crush or deliver with applesauce as needed  - Discontinue feeding and notify MD (or speech pathologist) if coughing or persistent throat clearing or wet/gurgly vocal quality is noted  Outcome: Progressing  HOB >30    Problem: PAIN - AD

## 2019-03-28 NOTE — PLAN OF CARE
Problem: Patient Centered Care  Goal: Patient preferences are identified and integrated in the patient's plan of care  Interventions:  - What would you like us to know as we care for you? Live at home with my wife. - Provide timely, complete, and accurate i Instruct pt to call for assistance with activity based on assessment  - Modify environment to reduce risk of injury  - Provide assistive devices as appropriate  - Consider OT/PT consult to assist with strengthening/mobility  - Encourage toileting schedule flavio hansent.     Problem: METABOLIC/FLUID AND ELECTROLYTES - ADULT  Goal: Electrolytes maintained within normal limits  INTERVENTIONS:  - Monitor labs and rhythm and assess patient for signs and symptoms of electrolyte imbalances  - Administer electrolyte repl Progressing      Problem: Impaired Swallowing  Goal: Minimize aspiration risk  Interventions:  - Patient should be alert and upright for all feedings (90 degrees preferred)  - Offer food and liquids at a slow rate  - No straws  - Encourage small bites of f

## 2019-03-29 NOTE — PROGRESS NOTES
[facility]    Progress Note    [address] [facility] Patient Status:  Inpatient    10/27/1940 MRN [id number]   Location [facility] 5SW/SE Attending Jeannie Olguin MD   [facility] Day # 6 PCP Miguel Rick NP     Subjective:  Priya Arthur

## 2019-05-06 ENCOUNTER — HOSPITAL ENCOUNTER (INPATIENT)
Facility: HOSPITAL | Age: 79
LOS: 4 days | Discharge: NURSING, REHAB OR LONG-TERM CARE | DRG: 698 | End: 2019-05-11
Attending: EMERGENCY MEDICINE | Admitting: INTERNAL MEDICINE
Payer: MEDICARE

## 2019-05-06 DIAGNOSIS — N17.9 AKI (ACUTE KIDNEY INJURY) (HCC): Primary | ICD-10-CM

## 2019-05-06 DIAGNOSIS — N30.00 ACUTE CYSTITIS WITHOUT HEMATURIA: ICD-10-CM

## 2019-05-06 PROCEDURE — 80048 BASIC METABOLIC PNL TOTAL CA: CPT | Performed by: EMERGENCY MEDICINE

## 2019-05-06 PROCEDURE — 96361 HYDRATE IV INFUSION ADD-ON: CPT

## 2019-05-06 PROCEDURE — 87086 URINE CULTURE/COLONY COUNT: CPT | Performed by: EMERGENCY MEDICINE

## 2019-05-06 PROCEDURE — 99285 EMERGENCY DEPT VISIT HI MDM: CPT

## 2019-05-06 PROCEDURE — 87186 SC STD MICRODIL/AGAR DIL: CPT | Performed by: EMERGENCY MEDICINE

## 2019-05-06 PROCEDURE — 81001 URINALYSIS AUTO W/SCOPE: CPT | Performed by: EMERGENCY MEDICINE

## 2019-05-06 PROCEDURE — 85025 COMPLETE CBC W/AUTO DIFF WBC: CPT | Performed by: EMERGENCY MEDICINE

## 2019-05-06 PROCEDURE — 96365 THER/PROPH/DIAG IV INF INIT: CPT

## 2019-05-06 PROCEDURE — 87088 URINE BACTERIA CULTURE: CPT | Performed by: EMERGENCY MEDICINE

## 2019-05-07 PROBLEM — N30.00 ACUTE CYSTITIS WITHOUT HEMATURIA: Status: ACTIVE | Noted: 2019-05-07

## 2019-05-07 PROCEDURE — 97530 THERAPEUTIC ACTIVITIES: CPT

## 2019-05-07 PROCEDURE — 83735 ASSAY OF MAGNESIUM: CPT | Performed by: INTERNAL MEDICINE

## 2019-05-07 PROCEDURE — 82962 GLUCOSE BLOOD TEST: CPT

## 2019-05-07 PROCEDURE — 80048 BASIC METABOLIC PNL TOTAL CA: CPT | Performed by: INTERNAL MEDICINE

## 2019-05-07 PROCEDURE — 85025 COMPLETE CBC W/AUTO DIFF WBC: CPT | Performed by: INTERNAL MEDICINE

## 2019-05-07 PROCEDURE — 83036 HEMOGLOBIN GLYCOSYLATED A1C: CPT | Performed by: INTERNAL MEDICINE

## 2019-05-07 PROCEDURE — 92610 EVALUATE SWALLOWING FUNCTION: CPT

## 2019-05-07 PROCEDURE — 97166 OT EVAL MOD COMPLEX 45 MIN: CPT

## 2019-05-07 PROCEDURE — 97162 PT EVAL MOD COMPLEX 30 MIN: CPT

## 2019-05-07 RX ORDER — FAMOTIDINE 20 MG/1
40 TABLET ORAL DAILY
Status: DISCONTINUED | OUTPATIENT
Start: 2019-05-07 | End: 2019-05-11

## 2019-05-07 RX ORDER — SODIUM CHLORIDE 9 MG/ML
INJECTION, SOLUTION INTRAVENOUS CONTINUOUS
Status: DISCONTINUED | OUTPATIENT
Start: 2019-05-07 | End: 2019-05-07

## 2019-05-07 RX ORDER — SODIUM CHLORIDE 9 MG/ML
INJECTION, SOLUTION INTRAVENOUS CONTINUOUS
Status: DISCONTINUED | OUTPATIENT
Start: 2019-05-07 | End: 2019-05-10

## 2019-05-07 RX ORDER — ATORVASTATIN CALCIUM 20 MG/1
20 TABLET, FILM COATED ORAL NIGHTLY
Status: DISCONTINUED | OUTPATIENT
Start: 2019-05-07 | End: 2019-05-11

## 2019-05-07 RX ORDER — DEXTROSE MONOHYDRATE 25 G/50ML
50 INJECTION, SOLUTION INTRAVENOUS AS NEEDED
Status: DISCONTINUED | OUTPATIENT
Start: 2019-05-07 | End: 2019-05-11

## 2019-05-07 RX ORDER — ALFUZOSIN HYDROCHLORIDE 10 MG/1
10 TABLET, EXTENDED RELEASE ORAL
Status: DISCONTINUED | OUTPATIENT
Start: 2019-05-07 | End: 2019-05-08

## 2019-05-07 RX ORDER — ONDANSETRON 2 MG/ML
4 INJECTION INTRAMUSCULAR; INTRAVENOUS EVERY 4 HOURS PRN
Status: DISCONTINUED | OUTPATIENT
Start: 2019-05-07 | End: 2019-05-11

## 2019-05-07 RX ORDER — HEPARIN SODIUM 5000 [USP'U]/ML
5000 INJECTION, SOLUTION INTRAVENOUS; SUBCUTANEOUS EVERY 8 HOURS SCHEDULED
Status: DISCONTINUED | OUTPATIENT
Start: 2019-05-07 | End: 2019-05-11

## 2019-05-07 RX ORDER — OXYBUTYNIN CHLORIDE 5 MG/1
5 TABLET ORAL 3 TIMES DAILY
Status: DISCONTINUED | OUTPATIENT
Start: 2019-05-07 | End: 2019-05-11

## 2019-05-07 RX ORDER — SERTRALINE HYDROCHLORIDE 25 MG/1
25 TABLET, FILM COATED ORAL DAILY
Status: DISCONTINUED | OUTPATIENT
Start: 2019-05-07 | End: 2019-05-11

## 2019-05-07 NOTE — ED PROVIDER NOTES
Patient Seen in: [facility] AND [facility] Emergency Department    History   Patient presents with:  Urinary Symptoms (urologic)      HPI    Patient presents to the ED complaining of intermittent spasming pain in his lower abdomen.   He is concerned he is having Socioeconomic History      Marital status:       Spouse name: Not on file      Number of children: Not on file      Years of education: Not on file      Highest education level: Not on file    Tobacco Use      Smoking status: Never Smoker      Smok All other components within normal limits   BASIC METABOLIC PANEL (8) - Abnormal; Notable for the following components:    Glucose 136 (*)     Chloride 113 (*)     CO2 20.0 (*)     BUN 69 (*)     Creatinine 2.15 (*)     BUN/CREA Ratio 32.1 (*)     Calc time range)   Insulin Aspart Pen (NOVOLOG) 100 UNIT/ML flexpen 1-5 Units (has no administration in time range)   Heparin Sodium (Porcine) 5000 UNIT/ML injection 5,000 Units (has no administration in time range)   CefTRIAXone Sodium (ROCEPHIN) 1 g in sodium evidence for significant dehydration and acute kidney injury. Also evidence for urinary tract infection on urinalysis. Patient started on aggressive IV fluids in the ED and IV antibiotics.   I feel will need admission for continued antibiotic therapy hydr

## 2019-05-07 NOTE — SLP NOTE
ADULT SWALLOWING EVALUATION    ASSESSMENT    ASSESSMENT/OVERALL IMPRESSION:  Pt seen sitting upright in bed for all PO trials and evaluation.  Pt verbalized his knowledge of history of dysphagia with reported recommendations of mechanical soft diet with Sanjana Ndiaye HISTORY  Reason for Referral: R/O aspiration    Problem List  Principal Problem:    USHA (acute kidney injury) ([facility]  Active Problems:    Acute cystitis without hematuria      Past Medical History  Past Medical History:   Diagnosis Date   • Diabetes ([facility] strategies independently over 2 session(s).     In Progress   Goal #3 The patient will utilize compensatory strategies as outlined by  BSSE (clinical evaluation) including Slow rate, Small bites, Small sips, Multiple swallows, Alternate liquids/solids, No s

## 2019-05-07 NOTE — PLAN OF CARE
Wife Hilario Uribe called and made aware of patient transfer to room 549. Report given to [facility].

## 2019-05-07 NOTE — H&P
[facility] Hospitalist H&P       CC: Patient presents with:  Urinary Symptoms (urologic)       PCP: Taz Costa NP    History of Present Illness:  Pt is a 69year old male hx prior CVA, DMII, HTN, DMII who was brought to the ED by family due to concerns f reviewed and negative except for what is stated above in HPI. Including negative for fevers, chills, chest pain, shortness of breath, syncope.        OBJECTIVE:   05/06/19  2131 05/06/19  2245 05/07/19  0012 05/07/19  0801   BP: 138/78 135/61 155/69 130/66 intake  - treatment of UTI above  - hold ACEi and other nephrotoxins  - IVFs  - Cr downtrending, continue to monitor    # Hx urinary retention with chronic indwelling narvaez  - narvaez changed in ED  - urology consulted, apprec further recs  - uroxatral for t

## 2019-05-07 NOTE — PLAN OF CARE
Problem: Diabetes/Glucose Control  Goal: Glucose maintained within prescribed range  Description  INTERVENTIONS:  - Monitor Blood Glucose as ordered  - Assess for signs and symptoms of hyperglycemia and hypoglycemia  - Administer ordered medications to m resources  Description  INTERVENTIONS:  - Identify barriers to discharge w/pt and caregiver  - Include patient/family/discharge partner in discharge planning  - Arrange for needed discharge resources and transportation as appropriate  - Identify discharge monitor

## 2019-05-07 NOTE — PLAN OF CARE
Problem: Diabetes/Glucose Control  Goal: Glucose maintained within prescribed range  Description  INTERVENTIONS:  - Monitor Blood Glucose as ordered  - Assess for signs and symptoms of hyperglycemia and hypoglycemia  - Administer ordered medications to m resources  Description  INTERVENTIONS:  - Identify barriers to discharge w/pt and caregiver  - Include patient/family/discharge partner in discharge planning  - Arrange for needed discharge resources and transportation as appropriate  - Identify discharge residual pink urine from removing old and placing new narvaez, no c/o sob, dc when cleared medically, all pt needs met.

## 2019-05-07 NOTE — PLAN OF CARE
Problem: Diabetes/Glucose Control  Goal: Glucose maintained within prescribed range  Description  INTERVENTIONS:  - Monitor Blood Glucose as ordered  - Assess for signs and symptoms of hyperglycemia and hypoglycemia  - Administer ordered medications to m resources  Description  INTERVENTIONS:  - Identify barriers to discharge w/pt and caregiver  - Include patient/family/discharge partner in discharge planning  - Arrange for needed discharge resources and transportation as appropriate  - Identify discharge to gravity. ACCU ACHS maintained. IVF continued. He is tolerating his nectar thickened chopped diet.

## 2019-05-07 NOTE — ED NOTES
Pt states h/o stroke, on dysphagia diet with nectar thick liquids and has not been able to eat or drink very much the past few days. States has had increased weakness.  Family states he can usually pivot from chair to bed, but past few days has been difficu

## 2019-05-07 NOTE — ED NOTES
Pt had nravaez catheter from home. Catheter removed. Blood clot noted on removal. New catheter inserted.

## 2019-05-07 NOTE — PHYSICAL THERAPY NOTE
PHYSICAL THERAPY EVALUATION - INPATIENT     Room Number: 458/298-Z  Evaluation Date: 5/7/2019  Type of Evaluation: Initial   Physician Order: PT Eval and Treat    Presenting Problem: USHA; acute cystitis w/o hematuria; weakness  Reason for Therapy: Mitzi Andre calculated based on documentation in the [facility] '6 clicks' Inpatient Basic Mobility Short Form. Research supports that patients with this level of impairment may benefit from LTAC.     However, this therapist is recommending sub-acute rehab at [facility] d/c not able to provide assistance to him)  Drives: No  Patient Owned Equipment: (R platform RW, R AFO, w/c)       Prior Level of [place]: From chart review, it seems that pt is normally able to complete bed mobility, sit to stand transfer and SPT (w/ ronit over in bed (including adjusting bedclothes, sheets and blankets)?: A Lot   -   Sitting down on and standing up from a chair with arms (e.g., wheelchair, bedside commode, etc.): Unable   -   Moving from lying on back to sitting on the side of the bed?: A L

## 2019-05-07 NOTE — CONSULTS
[facility]    Report of Consultation    Meg Leaks Patient Status:  Inpatient    10/27/1940 MRN [id number]   Location [facility] 5SW/SE Attending Ivan Hartman MD   Hosp Day # 0 PCP Juany Osman NP     Reason for Con Facility-Administered Medications:   •  ondansetron HCl (ZOFRAN) injection 4 mg, 4 mg, Intravenous, Q4H PRN  •  dextrose 50 % injection 50 mL, 50 mL, Intravenous, PRN  •  Glucose-Vitamin C (DEX-4) 4-6 GM-MG chewable tab 4 tablet, 4 tablet, Oral, Q15 Min DE respirations  Abdomen: soft, non-tender, non-distended  Extremities: moves all extremities equally, no deformities  Neurologic: Grossly normal  Psych: normal mood and affect      Laboratory Data:  Lab Results   Component Value Date    WBC 12.7 05/07/2019

## 2019-05-07 NOTE — ED INITIAL ASSESSMENT (HPI)
Inc weakness, urinary symptoms suspicious of a UTI. Pt has chronic narvaez placed in march 2019 for retention.  + bladder spasms

## 2019-05-08 PROCEDURE — 80048 BASIC METABOLIC PNL TOTAL CA: CPT | Performed by: INTERNAL MEDICINE

## 2019-05-08 PROCEDURE — 92526 ORAL FUNCTION THERAPY: CPT

## 2019-05-08 PROCEDURE — 82962 GLUCOSE BLOOD TEST: CPT

## 2019-05-08 PROCEDURE — 85025 COMPLETE CBC W/AUTO DIFF WBC: CPT | Performed by: INTERNAL MEDICINE

## 2019-05-08 NOTE — PLAN OF CARE
Problem: Diabetes/Glucose Control  Goal: Glucose maintained within prescribed range  Description  INTERVENTIONS:  - Monitor Blood Glucose as ordered  - Assess for signs and symptoms of hyperglycemia and hypoglycemia  - Administer ordered medications to m assessment.  - Educate pt/family on patient safety including physical limitations  - Instruct pt to call for assistance with activity based on assessment  - Modify environment to reduce risk of injury  - Provide assistive devices as appropriate  - Consider position, Vital Signs Stable, non-skid socks on, call light within reach, iBed awareness on, bed alarm on, calls appropriately. Pt resting in bed comfortably. Medication given crushed with apple sauce.       Problem: Impaired Functional Mobility  Goal: Ach

## 2019-05-08 NOTE — PAYOR COMM NOTE
--------------  ADMISSION REVIEW     Payor: [facility] [place] #:  [id number]  Authorization Number: [id number]      ED Provider Notes        Patient Seen in: [facility] Emergency Department    History   Patient presents with:  Ur reviewed.       ED Course        Labs Reviewed   URINALYSIS WITH CULTURE REFLEX - Abnormal; Notable for the following components:       Result Value    Clarity Urine Cloudy (*)     pH Urine 9.0 (*)     Protein Urine >=500 (*)     Ketones Urine 20  (*)     L arm   Pulse: 99 67 77   Resp: 18 18 18   Temp: 97.9 °F (36.6 °C)  98.4 °F (36.9 °C)   TempSrc: Oral  Axillary   SpO2: 98% 97% 99%   Weight: 62.6 kg  54.9 kg   Height: 165.1 cm (5' 5\")  165.1 cm (5' 5\")   Pulse Ox: 99%, Room air, Normal     Monitor Interp oriented, NAD, slow speech  Pulm: CTAB, normal respiratory effort  CV: RRR, nL S1/S2, no m/r/g  Abd: soft, NT/ND, BS+. No suprapubic TTP  MSK: moving all extremities, no LE edema  Neuro: R hand contraction with flaccid RLE (chronic). No new deficits.   Skin obstruction. It was recommended by Dr. Maurilio Harman that he continue with chronic Zamorano vs. SPT. The patient now presented to the ER after three days history of fatigue, weakness, and poor po intake. He also reports worsening bladder spasms x 3-4 days.  He was admit contracture   - no new issues  - PT/OT     # DMII  - hold home PO meds  - SQ SSI, accuchecks     # HTN  - holding lisinopril  - PRN hydralazine     # Depression  - SSRI     # GERD  - famotidine     Regular diet with thickened liquids   SQ  Full code  Person

## 2019-05-08 NOTE — PLAN OF CARE
Problem: Diabetes/Glucose Control  Goal: Glucose maintained within prescribed range  Description  INTERVENTIONS:  - Monitor Blood Glucose as ordered  - Assess for signs and symptoms of hyperglycemia and hypoglycemia  - Administer ordered medications to m deficits and behaviors that affect risk of falls.   - [place] fall precautions as indicated by assessment.  - Educate pt/family on patient safety including physical limitations  - Instruct pt to call for assistance with activity based on assessment  - Mod patient and family perspectives and choices   Outcome: Progressing     Problem: Impaired Functional Mobility  Goal: Achieve highest/safest level of mobility/gait  Description  Interventions:  - Assess patient's functional ability and stability  - Promote i

## 2019-05-08 NOTE — OCCUPATIONAL THERAPY NOTE
OCCUPATIONAL THERAPY EVALUATION - INPATIENT     Room Number: 549/549-A  Evaluation Date: 5/7/2019  Type of Evaluation: Initial  Presenting Problem: increased weakness, UTI     Physician Order: IP Consult to Occupational Therapy  Reason for Therapy: ADL/IAD have BM in depends. He gets into house while in [facility] with 2 person assist.    Pt completed bed mobility with Mod A x 2 provided intermittent verbal cues for sequencing.  He sat EOB x 10 min with up to mod A during dynamic reaching activities and hair grooming commode; Other (Comment)(pt has been using depends for BM, narvaez catheter for urine)  Shower/Tub and Equipment: Other (Comment)(sponge bath)  Other Equipment: Hospital bed    Occupation/Status: retired     Drives: No         Prior Level of [place]: Pt --minimal ROM with passive assist     ACTIVITIES OF DAILY LIVING ASSESSMENT  AM-PAC ‘6-Clicks’ Inpatient Daily Activity Short Form  How much help from another person does the patient currently need…  -   Putting on and taking off regular lower body clothin

## 2019-05-08 NOTE — PROGRESS NOTES
[facility] Hospitalist Progress Note     Normaabdiel Anthony Patient Status:  Inpatient    10/27/1940 MRN [id number]   Location [facility] 5SW/SE Attending Betty Markham MD   Hosp Day # 1 PCP Joleen Gordon NP     CC: follow up    SUBJECTIVE:  No a Meropenem <=0.25  Sensitive    Nitrofurantoin 128  Resistant    Piperacillin + Tazobactam <=4  Sensitive    Tobramycin 8  Intermediate    Trimethoprim/Sulfa >=320  Resistant          Imaging:        Meds:   Scheduled Medication:  • Insulin Aspart Pen  1    Outpatient records reviewed confirming patient's medical history and medications.      Time spent: greater than 35 minutes spent in d/w pt/family, coordination of care, and/or d/w staff.      Geri Whittington MD     [facility] IM Hospitalist  Pager: [phone]

## 2019-05-08 NOTE — PROGRESS NOTES
[facility] HOSP - [facility]    Progress Note    [address] [facility] Patient Status:  Inpatient    10/27/1940 MRN [id number]   [facility] 5SW/SE Attending Latanya Macedo MD   Hosp Day # 1 PCP Nicole Ayoub NP     Subjective:  Maricel Villatoro

## 2019-05-08 NOTE — SLP NOTE
SPEECH DAILY NOTE - INPATIENT    ASSESSMENT & PLAN   ASSESSMENT  Patient seen to monitor tolerance of PO diet and train compensatory strategies.   Patient reported he drinks thin liquids by spoon with chin tuck at home, although he told previous SLP he was Communication: Discussed with RN            GOALS  Goal #1 The patient will tolerate mechanical soft (chopped) consistency and nectar liquids without overt signs or symptoms of aspiration with 100 % accuracy over 2 session(s).     Patient is tolerating curr

## 2019-05-09 PROCEDURE — 82962 GLUCOSE BLOOD TEST: CPT

## 2019-05-09 PROCEDURE — 92526 ORAL FUNCTION THERAPY: CPT

## 2019-05-09 PROCEDURE — 85025 COMPLETE CBC W/AUTO DIFF WBC: CPT | Performed by: INTERNAL MEDICINE

## 2019-05-09 PROCEDURE — 97116 GAIT TRAINING THERAPY: CPT

## 2019-05-09 PROCEDURE — 97110 THERAPEUTIC EXERCISES: CPT

## 2019-05-09 PROCEDURE — 80048 BASIC METABOLIC PNL TOTAL CA: CPT | Performed by: INTERNAL MEDICINE

## 2019-05-09 RX ORDER — BISACODYL 10 MG
10 SUPPOSITORY, RECTAL RECTAL
Status: DISCONTINUED | OUTPATIENT
Start: 2019-05-09 | End: 2019-05-11

## 2019-05-09 RX ORDER — POLYETHYLENE GLYCOL 3350 17 G/17G
17 POWDER, FOR SOLUTION ORAL DAILY PRN
Status: DISCONTINUED | OUTPATIENT
Start: 2019-05-09 | End: 2019-05-11

## 2019-05-09 NOTE — PLAN OF CARE
Problem: Diabetes/Glucose Control  Goal: Glucose maintained within prescribed range  Description  INTERVENTIONS:  - Monitor Blood Glucose as ordered  - Assess for signs and symptoms of hyperglycemia and hypoglycemia  - Administer ordered medications to m increased pain with activity and pre-medicate as appropriate  5/9/2019 0400 by Shilpi Bragg RN  Outcome: Progressing  5/9/2019 0354 by Shilpi Bragg RN  Outcome: Progressing     Problem: SAFETY ADULT - FALL  Goal: Free from fall injury  Descriptio Patient preferences are identified and integrated in the patient's plan of care  Description  Interventions:  - What would you like us to know as we care for you? \"I have had a TIA and 2 strokes, I take nectar thick, I have r/side weakness. \"  - Provide t

## 2019-05-09 NOTE — PROGRESS NOTES
[facility] HOSP - [facility]    Progress Note    [address] [facility] Patient Status:  Inpatient    10/27/1940 MRN [id number]   [facility] 5SW/SE Attending Betty Markham MD   Hosp Day # 2 PCP Joleen Gordon NP     Subjective:  Isela Nielsen admission as infection needs to clear prior to procedure  Dr. Bradford Sensing able to place SPT once infection resolved  I offered to send surgery ticket to schedule outpatient SPT placement but he declines at this time  I told patient I would call his wife to explain

## 2019-05-09 NOTE — PHYSICAL THERAPY NOTE
PHYSICAL THERAPY TREATMENT NOTE - INPATIENT     Room Number: 549/549-A       Presenting Problem: USHA; acute cystitis w/o hematuria; weakness    Problem List  Principal Problem:    USHA (acute kidney injury) ([facility]  Active Problems:    Acute cystitis without Standing: Not tested    ACTIVITY TOLERANCE: Fair          AM-PAC '6-Clicks' INPATIENT SHORT FORM - BASIC MOBILITY  How much difficulty does the patient currently have. ..  -   Turning over in bed (including adjusting bedclothes, sheets and blankets)?: A Lot independence with home activity/exercise instructions provided to patient in preparation for discharge.    Goal #5   Current Status  in progress   Goal #6     Goal #6  Current Status

## 2019-05-09 NOTE — CM/SW NOTE
SW contacted pt's wife/Claudia to discuss d/c planning. Pt lives at home w/ wife. Wife requesting for pt to go to rehab, as she does not think she can take care of pt at this time. Wife requested for SW to discuss further w/ pt    SW met w/ pt to discuss.

## 2019-05-09 NOTE — DIETARY NOTE
ADULT NUTRITION INITIAL ASSESSMENT    Pt is at high nutrition risk. Pt meets malnutrition criteria.       RECOMMENDATIONS TO MD:  See Nutrition Intervention     CRITERIA FOR MALNUTRITION DIAGNOSIS: Criteria for severe malnutrition diagnosis: chronic illnes meal set up  - Coordination of nutrition care: collaboration with other providers  - Discharge and transfer of nutrition care to new setting or provider:  monitor plans    PERTINENT PAST MEDICAL HISTORY: See H&P.     ANTHROPOMETRICS:  HT: 165.1 cm (5' 5\") Straw and Mech Soft, chopped  Oral Nutrition Supplements (ONS) TID  Estimated Nutrition needs:   Calories: 5198-8077 calories/day (30-33 calories per kg Actual body wt (ABW))  Protein: 66 -83g protein/day (1.2- 1.5 g protein/kg  Actual body wt (ABW))    MO

## 2019-05-09 NOTE — SLP NOTE
SPEECH DAILY NOTE - INPATIENT    ASSESSMENT & PLAN   ASSESSMENT  Patient seen to monitor tolerance of PO diet and train compensatory strategies during meal intake of recommended diet.   Today, pt reported that he uses \"thickener\" at home but \"not all the signs or symptoms of aspiration with 100 % accuracy over 2 session(s). Pt observed with meal intake of current diet. Reflexive cough x1 on intake of puree soup. No additional signs of aspiration on subsequent trials of soup. Not met.     Goal #2 The pat

## 2019-05-09 NOTE — PROGRESS NOTES
[facility] Hospitalist Progress Note     Felisha Dawley Patient Status:  Inpatient    10/27/1940 MRN [id number]   Location [facility] 5SW/SE Attending Mike Fall MD   Hosp Day # 2 PCP Karis Jerome NP     CC: follow up    SUBJECTIVE:  Annette Hatch cefTRIAXone  1 g Intravenous Q24H   • famotidine  40 mg Oral Daily   • Sertraline HCl  25 mg Oral Daily   • atorvastatin  20 mg Oral Nightly   • Oxybutynin Chloride  5 mg Oral TID     Continuous Infusing Medication:  • sodium chloride 75 mL/hr at 05/09/19

## 2019-05-09 NOTE — PLAN OF CARE
Problem: Diabetes/Glucose Control  Goal: Glucose maintained within prescribed range  Description  INTERVENTIONS:  - Monitor Blood Glucose as ordered  - Assess for signs and symptoms of hyperglycemia and hypoglycemia  - Administer ordered medications to m falls.  - [place] fall precautions as indicated by assessment.  - Educate pt/family on patient safety including physical limitations  - Instruct pt to call for assistance with activity based on assessment  - Modify environment to reduce risk of injury  - Outcome: Progressing     Problem: Impaired Functional Mobility  Goal: Achieve highest/safest level of mobility/gait  Description  Interventions:  - Assess patient's functional ability and stability  - Promote increasing activity/tolerance for mobility an

## 2019-05-10 PROCEDURE — 82962 GLUCOSE BLOOD TEST: CPT

## 2019-05-10 PROCEDURE — 80048 BASIC METABOLIC PNL TOTAL CA: CPT | Performed by: INTERNAL MEDICINE

## 2019-05-10 PROCEDURE — 85025 COMPLETE CBC W/AUTO DIFF WBC: CPT | Performed by: INTERNAL MEDICINE

## 2019-05-10 RX ORDER — 0.9 % SODIUM CHLORIDE 0.9 %
VIAL (ML) INJECTION
Status: COMPLETED
Start: 2019-05-10 | End: 2019-05-10

## 2019-05-10 NOTE — PROGRESS NOTES
[facility]    Progress Note    [address] [facility] Patient Status:  Inpatient    10/27/1940 MRN [id number]   [facility] 5SW/SE Attending Lea Carmen MD   [facility] Day # 3 PCP Carolin Croft NP     Subjective:  Charlies Gum [phone]  c: [phone]

## 2019-05-10 NOTE — PLAN OF CARE
Problem: Diabetes/Glucose Control  Goal: Glucose maintained within prescribed range  Description  INTERVENTIONS:  - Monitor Blood Glucose as ordered  - Assess for signs and symptoms of hyperglycemia and hypoglycemia  - Administer ordered medications to m deficits and behaviors that affect risk of falls.   - [place] fall precautions as indicated by assessment.  - Educate pt/family on patient safety including physical limitations  - Instruct pt to call for assistance with activity based on assessment  - Mod patient/family to participate in care and decision-making at the level they choose  - Honor patient and family perspectives and choices   Outcome: Progressing

## 2019-05-10 NOTE — CM/SW NOTE
[facility] accept & has insurance [address], [address]

## 2019-05-10 NOTE — PROGRESS NOTES
[facility] Hospitalist Progress Note     PCP: Taz Costa NP    CC: Follow up       Assessment/Plan:      69year old male hx prior CVA, DMII, HTN, DMII who was brought to the ED by family due to concerns for dehydration and weakness.     # UTI  - in sett about the SPT. Wants to wait for now. No CP or SOB.   NO N/V      Objective     OBJECTIVE:  Temp:  [97.8 °F (36.6 °C)-98.2 °F (36.8 °C)] 97.8 °F (36.6 °C)  Pulse:  [53-58] 53  Resp:  [18] 18  BP: (126-153)/(50-63) 137/54    Intake/Output:    Intake/Output 05/09/19  0650 05/10/19  0628    145 145 145 142   K 4.9 5.0 4.6 3.9 3.7   * 115* 118* 117* 115*   CO2 20.0* 17.0* 21.0 19.0* 21.0   BUN 69* 68* 57* 36* 32*   CREATSERUM 2.15* 1.90* 1.58* 1.13 1.18   CA 9.6 9.1 8.7 8.3* 8.3*   MG  --  2.6  --

## 2019-05-10 NOTE — PLAN OF CARE
Problem: Diabetes/Glucose Control  Goal: Glucose maintained within prescribed range  Description  INTERVENTIONS:  - Monitor Blood Glucose as ordered  - Assess for signs and symptoms of hyperglycemia and hypoglycemia  - Administer ordered medications to m falls.  - [place] fall precautions as indicated by assessment.  - Educate pt/family on patient safety including physical limitations  - Instruct pt to call for assistance with activity based on assessment  - Modify environment to reduce risk of injury  - Outcome: Progressing     Problem: Impaired Functional Mobility  Goal: Achieve highest/safest level of mobility/gait  Description  Interventions:  - Assess patient's functional ability and stability  - Promote increasing activity/tolerance for mobility an

## 2019-05-10 NOTE — CDS QUERY
.Potential for Impaired Nutritional Status  DOCUMENTATION CLARIFICATION FORM  Dear Doctor Real Holbrook, (to answer this Verle  scrool to top of this form and click edit, then place an  X in the anupama that applies, add any additional information if nee

## 2019-05-11 VITALS
OXYGEN SATURATION: 96 % | SYSTOLIC BLOOD PRESSURE: 140 MMHG | RESPIRATION RATE: 16 BRPM | HEART RATE: 62 BPM | HEIGHT: 65 IN | WEIGHT: 121 LBS | BODY MASS INDEX: 20.16 KG/M2 | TEMPERATURE: 98 F | DIASTOLIC BLOOD PRESSURE: 61 MMHG

## 2019-05-11 PROCEDURE — 97530 THERAPEUTIC ACTIVITIES: CPT

## 2019-05-11 PROCEDURE — 97535 SELF CARE MNGMENT TRAINING: CPT

## 2019-05-11 PROCEDURE — 85025 COMPLETE CBC W/AUTO DIFF WBC: CPT | Performed by: HOSPITALIST

## 2019-05-11 PROCEDURE — 82962 GLUCOSE BLOOD TEST: CPT

## 2019-05-11 PROCEDURE — 80048 BASIC METABOLIC PNL TOTAL CA: CPT | Performed by: HOSPITALIST

## 2019-05-11 PROCEDURE — 97110 THERAPEUTIC EXERCISES: CPT

## 2019-05-11 RX ORDER — OXYBUTYNIN CHLORIDE 5 MG/1
5 TABLET ORAL 3 TIMES DAILY
Qty: 15 TABLET | Refills: 0 | Status: SHIPPED | OUTPATIENT
Start: 2019-05-11

## 2019-05-11 RX ORDER — CEPHALEXIN 500 MG/1
500 CAPSULE ORAL 2 TIMES DAILY
Qty: 16 CAPSULE | Refills: 0 | Status: SHIPPED | OUTPATIENT
Start: 2019-05-11 | End: 2019-06-13 | Stop reason: ALTCHOICE

## 2019-05-11 NOTE — CM/SW NOTE
CM note:  Spoke w/pt's RN Samantha  - pt does not have order to discharge present. Samantha  will update as needed. Contact number given. 3:30 pm ADDENDUM:  Spoke with Javier Sawyer at [facility]. Pt has been assigned to room 102 bed 2.   Javier Sawyer requests that p

## 2019-05-11 NOTE — PLAN OF CARE
Problem: Diabetes/Glucose Control  Goal: Glucose maintained within prescribed range  Description  INTERVENTIONS:  - Monitor Blood Glucose as ordered  - Assess for signs and symptoms of hyperglycemia and hypoglycemia  - Administer ordered medications to m falls.  - [place] fall precautions as indicated by assessment.  - Educate pt/family on patient safety including physical limitations  - Instruct pt to call for assistance with activity based on assessment  - Modify environment to reduce risk of injury  - Outcome: Progressing     Problem: Impaired Functional Mobility  Goal: Achieve highest/safest level of mobility/gait  Description  Interventions:  - Assess patient's functional ability and stability  - Promote increasing activity/tolerance for mobility an

## 2019-05-11 NOTE — OCCUPATIONAL THERAPY NOTE
OCCUPATIONAL THERAPY TREATMENT NOTE - INPATIENT        Room Number: 549/549-A           Presenting Problem: increased weakness, UTI     Problem List  Principal Problem:    USHA (acute kidney injury) ([facility] [address]  Active Problems:    Acute cystitis without hematuri Bearing Restriction: None                PAIN ASSESSMENT  Ratin       ACTIVITIES OF DAILY LIVING ASSESSMENT  AM-PAC ‘6-Clicks’ Inpatient Daily Activity Short Form  How much help from another person does the patient currently need…  -   Putting on and t

## 2019-05-11 NOTE — PLAN OF CARE
Problem: Diabetes/Glucose Control  Goal: Glucose maintained within prescribed range  Description  INTERVENTIONS:  - Monitor Blood Glucose as ordered  - Assess for signs and symptoms of hyperglycemia and hypoglycemia  - Administer ordered medications to m falls.  - [place] fall precautions as indicated by assessment.  - Educate pt/family on patient safety including physical limitations  - Instruct pt to call for assistance with activity based on assessment  - Modify environment to reduce risk of injury  - Outcome: Progressing     Problem: Impaired Functional Mobility  Goal: Achieve highest/safest level of mobility/gait  Description  Interventions:  - Assess patient's functional ability and stability  - Promote increasing activity/tolerance for mobility an

## 2019-05-11 NOTE — PROGRESS NOTES
[facility]  Urology Progress Note    Salvador Cheri Kumar Patient Status:  Inpatient    10/27/1940 MRN [id number]   [facility] 5SW/SE Attending Jose Lewis MD   [address] Day # 4 PCP Jon Devlin NP     Subjective:  Dontae Hernandez is

## 2019-05-11 NOTE — PHYSICAL THERAPY NOTE
PHYSICAL THERAPY TREATMENT NOTE - INPATIENT     Room Number: 549/549-A       Presenting Problem: USHA; acute cystitis w/o hematuria; weakness    Problem List  Principal Problem:    USHA (acute kidney injury) ([facility] [address]  Active Problems:    Acute cystitis without to demonstrate generalized weakness, balance deficits, decreased activity tolerance, RUE/RLE tone, R maldonado-paresis. Pt still needs 2 assists for mobility d/t deficits.  PT recommendation sub-acute rehab at d/c to address deficits and maximize patient indepen 1  Assistive Device: Rolling walker(R platform walker)  Pattern: Shuffle;R Decreased stance time(few steps with RLE)  Stoop/Curb Assistance: Not tested  Comment : MOD A x 2 for bed mobility    THERAPEUTIC EXERCISES  Lower Extremity Ankle pumps  Hip AB/AD

## 2019-05-12 NOTE — DISCHARGE SUMMARY
[facility] Hospitalist Discharge Summary   Patient ID:  Kianna Cannon [id number]  12 year old 10/27/1940    Admit date: 5/6/2019  Discharge date: 5/11/2019  Risk of Readmission Lace+ Score: 64  59-90 High Risk  29-58 Medium Risk  0-28   Low Risk    Primary Care no new issues  - PT/OT     # DMII  - resume home oral meds     # HTN  - lisinopril restarted at discharge     # Severe malnutrition  -per RD:  chronic illness related to wt loss greater than 10% in 6 months, energy intake   less than 75% for greater than 1 tolerated  Diet: diabetic diet  Wound Care: none needed  Code Status: Full Code    Important follow up: Rolanda Bardales MD In 2 weeks.     Specialty:  UROLOGY  Contact information:  [facility] [address]

## 2019-05-28 ENCOUNTER — HOSPITAL ENCOUNTER (OUTPATIENT)
Facility: HOSPITAL | Age: 79
Setting detail: HOSPITAL OUTPATIENT SURGERY
Discharge: HOME | End: 2019-05-28
Attending: UROLOGY | Admitting: UROLOGY
Payer: MEDICARE

## 2019-05-28 ENCOUNTER — ANESTHESIA EVENT (OUTPATIENT)
Dept: SURGERY | Facility: HOSPITAL | Age: 79
End: 2019-05-28

## 2019-05-28 ENCOUNTER — ANESTHESIA (OUTPATIENT)
Dept: SURGERY | Facility: HOSPITAL | Age: 79
End: 2019-05-28

## 2019-05-28 VITALS
DIASTOLIC BLOOD PRESSURE: 65 MMHG | WEIGHT: 142 LBS | SYSTOLIC BLOOD PRESSURE: 109 MMHG | TEMPERATURE: 98 F | OXYGEN SATURATION: 96 % | HEART RATE: 75 BPM | BODY MASS INDEX: 23.66 KG/M2 | RESPIRATION RATE: 18 BRPM | HEIGHT: 65 IN

## 2019-05-28 DIAGNOSIS — N30.00 ACUTE CYSTITIS WITHOUT HEMATURIA: ICD-10-CM

## 2019-05-28 PROCEDURE — 82962 GLUCOSE BLOOD TEST: CPT

## 2019-05-28 PROCEDURE — 0T9B00Z DRAINAGE OF BLADDER WITH DRAINAGE DEVICE, OPEN APPROACH: ICD-10-PCS | Performed by: UROLOGY

## 2019-05-28 PROCEDURE — 82365 CALCULUS SPECTROSCOPY: CPT | Performed by: UROLOGY

## 2019-05-28 PROCEDURE — 0TCB8ZZ EXTIRPATION OF MATTER FROM BLADDER, VIA NATURAL OR ARTIFICIAL OPENING ENDOSCOPIC: ICD-10-PCS | Performed by: UROLOGY

## 2019-05-28 RX ORDER — CEFAZOLIN SODIUM/WATER 2 G/20 ML
2 SYRINGE (ML) INTRAVENOUS ONCE
Status: DISCONTINUED | OUTPATIENT
Start: 2019-05-28 | End: 2019-05-28 | Stop reason: HOSPADM

## 2019-05-28 RX ORDER — DEXTROSE MONOHYDRATE 25 G/50ML
50 INJECTION, SOLUTION INTRAVENOUS
Status: DISCONTINUED | OUTPATIENT
Start: 2019-05-28 | End: 2019-05-28

## 2019-05-28 RX ORDER — SODIUM CHLORIDE, SODIUM LACTATE, POTASSIUM CHLORIDE, CALCIUM CHLORIDE 600; 310; 30; 20 MG/100ML; MG/100ML; MG/100ML; MG/100ML
INJECTION, SOLUTION INTRAVENOUS CONTINUOUS
Status: DISCONTINUED | OUTPATIENT
Start: 2019-05-28 | End: 2019-05-28

## 2019-05-28 RX ORDER — ONDANSETRON 2 MG/ML
4 INJECTION INTRAMUSCULAR; INTRAVENOUS AS NEEDED
Status: DISCONTINUED | OUTPATIENT
Start: 2019-05-28 | End: 2019-05-28

## 2019-05-28 RX ORDER — NALOXONE HYDROCHLORIDE 0.4 MG/ML
80 INJECTION, SOLUTION INTRAMUSCULAR; INTRAVENOUS; SUBCUTANEOUS AS NEEDED
Status: DISCONTINUED | OUTPATIENT
Start: 2019-05-28 | End: 2019-05-28

## 2019-05-28 RX ORDER — HYDROCODONE BITARTRATE AND ACETAMINOPHEN 5; 325 MG/1; MG/1
2 TABLET ORAL AS NEEDED
Status: DISCONTINUED | OUTPATIENT
Start: 2019-05-28 | End: 2019-05-28

## 2019-05-28 RX ORDER — CEPHALEXIN 500 MG/1
500 TABLET ORAL 3 TIMES DAILY
Qty: 9 TABLET | Refills: 0 | Status: SHIPPED | OUTPATIENT
Start: 2019-05-28 | End: 2019-05-31

## 2019-05-28 RX ORDER — DEXTROSE MONOHYDRATE 25 G/50ML
50 INJECTION, SOLUTION INTRAVENOUS
Status: DISCONTINUED | OUTPATIENT
Start: 2019-05-28 | End: 2019-05-28 | Stop reason: HOSPADM

## 2019-05-28 RX ORDER — HYDROMORPHONE HYDROCHLORIDE 1 MG/ML
0.4 INJECTION, SOLUTION INTRAMUSCULAR; INTRAVENOUS; SUBCUTANEOUS EVERY 5 MIN PRN
Status: DISCONTINUED | OUTPATIENT
Start: 2019-05-28 | End: 2019-05-28

## 2019-05-28 RX ORDER — ACETAMINOPHEN 500 MG
1000 TABLET ORAL ONCE
Status: DISCONTINUED | OUTPATIENT
Start: 2019-05-28 | End: 2019-05-28 | Stop reason: HOSPADM

## 2019-05-28 RX ORDER — ACETAMINOPHEN 500 MG
1000 TABLET ORAL ONCE
COMMUNITY
End: 2019-06-13 | Stop reason: ALTCHOICE

## 2019-05-28 RX ORDER — HYDROCODONE BITARTRATE AND ACETAMINOPHEN 5; 325 MG/1; MG/1
1-2 TABLET ORAL EVERY 4 HOURS PRN
Qty: 20 TABLET | Refills: 0 | Status: SHIPPED | OUTPATIENT
Start: 2019-05-28 | End: 2019-06-07

## 2019-05-28 RX ORDER — CEFAZOLIN SODIUM/WATER 2 G/20 ML
SYRINGE (ML) INTRAVENOUS
Status: DISCONTINUED | OUTPATIENT
Start: 2019-05-28 | End: 2019-05-28

## 2019-05-28 RX ORDER — HYDROCODONE BITARTRATE AND ACETAMINOPHEN 5; 325 MG/1; MG/1
1 TABLET ORAL AS NEEDED
Status: DISCONTINUED | OUTPATIENT
Start: 2019-05-28 | End: 2019-05-28

## 2019-05-28 NOTE — OPERATIVE REPORT
[facility]  Urology Procedure Note  Raymundodaron Dawkins Patient Status:  Hospital Outpatient Surgery    10/27/1940 MRN [id number]   [facility] SURGERY Attending Dulce Potts MD   Hosp Day # 0 PCP Jong Shine NP     Procedure: Cysto history of stroke and right sided weakness. He also has urinary retention in the absence of prostate obstruction. He has developed UTI with a narvaez as well as urethral erosion. The decision was made to convert him to a suprapubic tube.     Surgeon:  Dr. Brenda Shearer

## 2019-05-28 NOTE — H&P
History of Present Illness:  Ramon Blanco is a 69year old male with PMH stroke with residual right sided weakness. During a recent admission (3/2019) he was found to have urinary retention and a Zamorano catheter was placed.  He then had a subsequent cysto Subcutaneous, TID CC  •  Heparin Sodium (Porcine) 5000 UNIT/ML injection 5,000 Units, 5,000 Units, Subcutaneous, Q8H [facility]  •  CefTRIAXone Sodium (ROCEPHIN) 1 g in sodium chloride 0.9% 100 mL MBP/ADD-vantage, 1 g, Intravenous, Q24H  •  famoTIDine (PEPCID) ta an SP tube placement. We discussed the risks, benefits, alternatives, and complications.  The patient and his wife wished to proceed.

## 2019-05-28 NOTE — PROGRESS NOTES
Luis Moreno at [facility] contacted to verify pt meds taken this morning. All meds taken as charted in preop list except DM meds.

## 2019-05-28 NOTE — ANESTHESIA PREPROCEDURE EVALUATION
PRE-OP EVALUATION    Patient Name: Lucie Becker    Pre-op Diagnosis: Acute cystitis without hematuria [N30.00]    Procedure(s):  SUPRAPUBIC TUBE PLACEMENT, POSSIBLE CYSTOSCOPY    Surgeon(s) and Role:     * Wil Louie MD - Primary    Pre-op vitals re Evaluation    Patient summary reviewed. Anesthetic Complications  (-) history of anesthetic complications         GI/Hepatic/Renal      (+) GERD                           Cardiovascular      ECG reviewed.   Exercise tolerance: poor     MET: <=4      (+) with LMA. Risks and benefits pertaining to the proposed anesthetic and postoperative plan for pain, nausea/vomiting were discussed with patient.   Risks of general anesthetic include but not are not limited to adverse reactions related to medications admin

## 2019-05-28 NOTE — ANESTHESIA POSTPROCEDURE EVALUATION
[facility] Patient Status:  Hospital Outpatient Surgery   Age/Gender 69year old male MRN [id number]   Location [address] [facility] Attending Dulce Potts MD   Hosp Day # 0 PCP GLENN Romo

## 2020-04-03 NOTE — PROGRESS NOTES
Caller would like to discuss an/a Appointment to see Dr. Woods. Patient received a phone call to reschedule her 5/1/2020 appointment, but schedule is full.  She would like a morning appointment if possible.      Writer advised caller of callback within 24-72 hours.    Patient Name: Dilcia Nunes  Caller Name: same  Name of Facility: none  Callback Number: [phone]  Best Availability: anytime  Can A Detailed Message Be left? yes  Fax Number: none  Additional Info: See above  Did you confirm the message with the caller?: yes    Thank you,  Tammy Schuster     [place] [facility]    Progress Note    [address] Patient Status:  Inpatient    10/27/1940 MRN [id number]   Location [facility] 5SW/SE Attending Elvia Hatch MD   [facility] Day # 2 PCP No primary care provider on file.         Subj

## 2021-03-05 DIAGNOSIS — Z23 NEED FOR VACCINATION: ICD-10-CM

## 2022-08-24 NOTE — PROGRESS NOTES
[facility] Hospitalist Progress Note     CC: Hospital Follow up    PCP: Ramandeep Shearer NP       Assessment/Plan:     Principal Problem:    Acute cystitis with hematuria    Mr. Mo Keith is a 69 M with PMH of DM2, HTN, hx stroke with residual R sided weakne 56  Resp:  [18-20] 18  BP: (114-143)/(50-64) 114/50      Intake/Output:    Intake/Output Summary (Last 24 hours) at 3/24/2019 1422  Last data filed at 3/24/2019 0918  Gross per 24 hour   Intake 638 ml   Output —   Net 638 ml       Last 3 Weights  03/22/19 pancreatic mass, pseudocyst or fluid collection. Correlation with pancreatic enzymes recommended. No calcifications to suggest chronic pancreatitis. 2. Prior cholecystectomy. Common bile duct not well seen. No apparent biliary ductal dilatation.   No in no

## (undated) DEVICE — REM POLYHESIVE ADULT PATIENT RETURN ELECTRODE: Brand: VALLEYLAB

## (undated) DEVICE — #15 STERILE STAINLESS BLADE: Brand: STERILE STAINLESS BLADES

## (undated) DEVICE — PLASTC TOOMEY SYRNG DISP

## (undated) DEVICE — BAG URINE LEG W/VELCRO

## (undated) DEVICE — SPONGE RAYTEC 4X4 RF DETECT

## (undated) DEVICE — CAUTERY PENCIL

## (undated) DEVICE — SUTURE PDS II 0 CT-1

## (undated) DEVICE — TUBING CYSTO

## (undated) DEVICE — BAG DRAIN INFECTION CNTRL 2000

## (undated) DEVICE — SUPRAPUBIC PROCENDURAL TRAY

## (undated) DEVICE — TOWEL OR BLU 16X26 STRL

## (undated) DEVICE — SUTURE CHROMIC GUT 3-0 SH

## (undated) DEVICE — NEEDLE SPINAL 25X3-1/2 BLUE

## (undated) DEVICE — KENDALL SCD EXPRESS SLEEVES, KNEE LENGTH, MEDIUM: Brand: KENDALL SCD

## (undated) DEVICE — GAUZE SPONGES,USP TYPE VII GAUZE, 12 PLY: Brand: CURITY

## (undated) DEVICE — SUTURE VICRYL 2-0 UR-6

## (undated) DEVICE — GAMMEX® PI HYBRID SIZE 7, STERILE POWDER-FREE SURGICAL GLOVE, POLYISOPRENE AND NEOPRENE BLEND: Brand: GAMMEX

## (undated) DEVICE — PREMIUM WET SKIN PREP TRAY: Brand: MEDLINE INDUSTRIES, INC.

## (undated) DEVICE — SUTURE VICRYL 3-0 SH

## (undated) DEVICE — GYN CDS: Brand: MEDLINE INDUSTRIES, INC.

## (undated) NOTE — IP AVS SNAPSHOT
Patient Demographics     Address  [facility] [place] [address] Phone  [phone] [facility]  [phone] [facility]      Emergency Contact(s)     Name Relation Home Work [address] Spouse [phone]      Allergies as of Next dose due: Tonight 3/28/19      Take 20 mg by mouth nightly. tamsulosin HCl 0.4 MG Caps  Commonly known as:  FLOMAX  Next dose due: Tomorrow 3/29/19      Take 0.4 mg by mouth daily.                 Where to Get Your Medications      Please pi Vitals  138/63 Filed at 03/28/2019 0833   Pulse  56 Filed at 03/28/2019 0833   Resp  18 Filed at 03/28/2019 0833   Temp  97.5 °F (36.4 °C) Filed at 03/28/2019 0833   SpO2  96 % Filed at 03/28/2019 0833      Patient's Most Recent Weight       Most Recent Va Author:  Jasvir Hill MD Service:  Hospitalist Author Type:  Physician    Filed:  3/23/2019  3:18 PM Date of Service:  3/23/2019 10:14 AM Status:  Signed    :  Jasvir Hill MD (Physician)         [facility] Hospitalist H&P       CC: Patient pre Mr. Joaquina Lewis is a 69 M with PMH of DM2, HTN, hx stroke with residual R sided weakness who presented from rehab with UTI.  Patient had been having dysuria x 2 weeks, treated with 10 days course of augmentin, but has hx of ESBL and was sent to the ED for • No Known Problems Daughter    • No Known Problems Son    • Diabetes Sister    • No Known Problems Brother        Review of Systems  Comprehensive ROS reviewed and negative except for what's stated above.    OBJECTIVE:  /54 (BP Location: Left arm) GV.3 Tonio Dolan MD on 3/23/2019 10:47 AM  GV. 4 - Eliel Alves MD on 3/23/2019  3:14 PM                        Consults - MD Consult Notes      Consults filed by Indiana Estevez MD at 3/23/2019  3:40 PM / Draft: Not Electronically Signed HEENT:  Pale conjunctivae. No oral lesions. NECK:  Supple. No JVD or adenopathy. LUNGS:  Diminished breath sounds but clear. HEART:  1-2 systolic ejection murmur. ABDOMEN:  Soft, not tender. There is mild discomfort over the suprapubic area.   No oth :  Kyara Sorensen MD (Physician)       General Medicine Discharge Summary     Patient ID:  Dontae Hernandez  69year old  10/27/1940    Admit date: 3/22/2019    Discharge date and time: 03/28/19    Attending Physician: Kyara Sorensen MD     P DM2  - home regimen: metformin, glimepiride- hold  - accuchecks QID, hypoglycemic protocol, SSI     Normocytic anemia  - Hg 11.3 on admit, down to 9.9 today, likely dilutional with fluids  - baseline 10-12  - monitor     HTN  - BP stable  - continue home l Follow-up with labs: none    Total Time Coordinating Care: Greater than 30 minutes    Patient had opportunity to ask questions and state understand and agree with therapeutic plan as outlined      Kenya Haile MD  [facility] Hospitalist[GV.1]          Electr Patient is good with his safety awareness, able to direct himself during transfers and standing activity. The patient's[DK.1] Approx Degree of Impairment: 57.7%[DK. 2] has been calculated based on documentation in the [facility] '6 clicks' Inpatient Basic M Standardized Score (AM-PAC Scale): 39.45   CMS Modifier (G-Code): CK[DK. 2]    FUNCTIONAL ABILITY STATUS  Gait Assessment[DK. 1]   Gait Assistance:  Moderate assistance  Distance (ft): 10  Assistive Device: Rolling walker(rt side platform)  Pattern: R Decreas Rehabilitation Author Type:  Physical Therapist    Filed:  3/28/2019 10:19 AM Date of Service:  3/27/2019 10:06 AM Status:  Addendum    :  Bam Marsh PTA (Physical Therapist)    Related Notes:  Original Note by Bam Marsh PTA (Physical WEIGHT BEARING RESTRICTION  Weight Bearing Restriction: None                PAIN ASSESSMENT   Ratin          BALANCE                                                                                                                     Static Sitting: Goo Goal #1 Patient is able to demonstrate supine - sit EOB @ level: modified independent     Goal #1   Current Status Mod A   Goal #2 Patient is able to demonstrate transfers Sit to/from Stand at assistance level: modified independent with walker - platform o to chair with Min A x 2 with walker dragging his rt foot. patient needs Min A to sit down in chair. Patient instructed on exer for b legs using left to assist rt at times with movements. Assist patient to put his shoes and rt AFO when he is still in bed. -   Moving to and from a bed to a chair (including a wheelchair)?: A Little   -   Need to walk in hospital room?: Total   -   Climbing 3-5 steps with a railing?: Total     AM-PAC Score:  Raw Score: 14   Approx Degree of Impairment: 61.29%   Standardized Sc Physical Therapy Note signed by Kelli Farias PT at 3/26/2019  4:59 PM  Version 1 of 1    Author:  Kelli Farias PT Service:  — Author Type:  Physical Therapist    Filed:  3/26/2019  4:59 PM Date of Service:  3/26/2019  3:32 PM Status:  Signed    : use railing to sit himself up using left arm. Right arm is non-functional but he does use RUE to support on RW platform to mobilize.  He is able to stand and balance for 1-2 minutes each time, assist to manage clothing today but normally manages himself at • REPAIR ING HERNIA,5+Y/O,REDUCIBL         HOME SITUATION  Type of Home: House   Home Layout: One level  Stairs to Enter :  2(they have installed a board, 2 person assist into home)             Lives With: Spouse  Drives: No  Patient Owned Equipment: Jeffrey Goff extensor tone noted, able to move knee/hip against gravity but did not MMT    BALANCE  Static Sitting: Good  Dynamic Sitting: Fair +  Static Standing: Poor +  Dynamic Standing: Poor    NEUROLOGICAL FINDINGS  Neurological Findings: Tone(Baseline -right maldonado Transfers: min A for sit to stand and bed to chair with RW (we do not have platform attachment so PT holding RUE and pt able to use this well for balance). He should transfer to the left. He wears his AFO during the day, not at night.  Did not want to put i OCCUPATIONAL THERAPY TREATMENT NOTE - INPATIENT        Room Number: 555/555-A           Presenting Problem: acute cystitis    Problem List  Principal Problem:    Acute cystitis with hematuria      OCCUPATIONAL THERAPY ASSESSMENT[HW.1]     Pt was seen for O ACTIVITY TOLERANCE                         O2 SATURATIONS                ACTIVITIES OF DAILY LIVING ASSESSMENT  AM-PAC ‘6-Clicks’ Inpatient Daily Activity Short Form  How much help from another person does the patient currently need…  -   Putting on and Patient will maintain static standing at sink level for 3 min w/ chair behind him and supervison  Comment:[HW.1] NT[HW.2]                  Goals  on: 19  Frequency: 3x/week[HW.1]             Attribution [place]    HW.1 - Gerardo Grigsby OT on 3/27/ prior cva;flexion contracture R hand. Pt wears afo at home. Pt currently below baseline and would benefit from continued skilled OT to address deficits. Pending progress anticipate that pt henny be able to return to home upon discharge.  At end of session pt Patient Regularly Uses: None[ANA.2]    Stairs in Home: 2 stairs to enter  Use of Assistive Device(s): platform walker wc    Prior Level of [place]: Pta pt was living at home w/ his wife.  Pt reports that at home he is able to perform spt and dressing ta rw and min a    BALANCE ASSESSMENT  Static Sitting: supervison  Dynamic Sitting: na  Static Standing: rw and min a  Dynamic Standing: rw and min a    FUNCTIONAL ADL ASSESSMENT  Grooming: set up/min a  Feeding: set up/min a  Bathing: mod a  Toileting: mod a

## (undated) NOTE — IP AVS SNAPSHOT
[facility]            (For Outpatient Use Only) Initial Admit Date: 3/22/2019   Inpt/Obs Admit Date: Inpt: 3/22/19 / Obs: N/A   Discharge Date:    Huel Curling:  [de-identified]   MRN: [de-identified]   CSN: [id number]        ENCOUNTER  Patient Class Hospital Account Financial Class: Medicare Advantage    March 28, 2019

## (undated) NOTE — IP AVS SNAPSHOT
[facility]            (For Outpatient Use Only) Initial Admit Date: 12/3/2018   Inpt/Obs Admit Date: Inpt: 12/4/18 / Obs: N/A   Discharge Date:    Dewayne Jolly:  [de-identified]   MRN: [de-identified]   CSN: [id number]        ENCOUNTER  Patient Class

## (undated) NOTE — IP AVS SNAPSHOT
Patient Demographics     Address  [facility]ly [facility] Maria A Sierra [address] [place], [place] [address]alphonse Gonzalez [address] Phone  [phone] [facility]  [phone] [facility]      Emergency Contact(s)     Name Relation Home Work Lily Spouse   [phone]      Allergies as of 12/7/2018 [id number] CefTRIAXone Sodium (ROCEPHIN) 1 g in sodium chloride 0.9 % 100 mL MBP/ADD-vantage 12/07/18 1031 New Bag      [id number] Magnesium Sulfate IVPB premix SOLN 2 g 12/07/18 1131 New Bag      [id number] Methylphenidate HCl (RITALIN) tab 2.5 mg 12/07/18 Patient Weight  55.6 kg (122 lb 9.6 oz)         Lab Results Last 24 Hours      BASIC METABOLIC PANEL (8) [[id number]] (Abnormal)  Resulted: 12/07/18 0704, Result status: Final result   Ordering provider:  Nishi Nguyen MD  12/06/18 2300 Resulting lab: ---------                               -----------         ------                     CBC W/ DIFFERENTIAL[[id number]]          Abnormal            Final result                 Please view results for these tests on the individual orders.     Specimen Inform • Other (macular degeneration) Mother    • No Known Problems Daughter    • No Known Problems Son    • Diabetes Sister    • No Known Problems Brother      Social History:  Social History    Tobacco Use      Smoking status: Never Smoker      Smokeless tobacc WBC 12.6 (H) 12/03/2018    HGB 14.1 12/03/2018    HCT 44.1 12/03/2018     12/03/2018    CREATSERUM 1.80 (H) 12/03/2018    BUN 99 (H) 12/03/2018     (H) 12/03/2018    K 4.4 12/03/2018     (H) 12/03/2018    CO2 28 12/03/2018     (H :  Cachorro Carrera MD (Physician)    Related Notes:  Original Note by Cachorro Carrera MD (Physician) filed at 12/5/2018  2:12 AM     Consult Orders    1.  IP Consult to Nephrology Once [[id number]] ordered by Cy May MD at 12/04/18 7379 Number of children: Not on file      Years of education: Not on file      Highest education level: Not on file    Social Needs      Financial resource strain: Not on file      Food insecurity - worry: Not on file      Food insecurity - inability: Not o 0.45% NaCl infusion  Intravenous Continuous       Medications Prior to Admission:  MetFORMIN HCl 500 MG Oral Tab Take 500 mg by mouth daily with breakfast.   lisinopril 10 MG Oral Tab Take 10 mg by mouth daily.    glimepiride 1 MG Oral Tab Take 1 mg by mout Head: Normocephalic, atraumatic  Eyes: conjunctivae/corneas clear  Throat: lips, mucosa, and tongue normal; teeth and gums normal  Neck:  no JVD, supple, symmetrical  Pulmonary:  clear to auscultation bilaterally  Cardiovascular: S1, S2 normal, no rub or Thank you  Will follow  ibis    Thank you for allowing me to participate in the care of your patient. Madhav Esparza  12/5/2018[MC.1]      Electronically signed by Theodore Stewart MD on 12/5/2018  6:44 PM   Attribution [address]    HAYLEY.1 - David Win Take 10 mg by mouth daily. glimepiride 1 MG Oral Tab  Take 1 mg by mouth daily with breakfast.    Sertraline HCl 25 MG Oral Tab  Take 25 mg by mouth daily. simvastatin 20 MG Oral Tab  Take 20 mg by mouth nightly.     famotidine 40 MG Oral Tab  Take 40 of 2 but if attempted to take a step required Max A of 2 as pt was shuffling and unable to unweight to successfully take a step. SPT without AD with Mod A of 2 at end of session, left up in chair with all needs in reach and chair alarm on.      Educated pt -   Moving to and from a bed to a chair (including a wheelchair)?: A Lot   -   Need to walk in hospital room?: A Lot   -   Climbing 3-5 steps with a railing?: Total     AM-PAC Score:  Raw Score: 11   PT Approx Degree of Impairment Score: 72.57%   Standardi 2:05 PM  Version 1 of 1    Author:  Kyle Patel PT Service:  Rehab Author Type:  Physical Therapist    Filed:  12/5/2018  2:05 PM Date of Service:  12/5/2018  1:47 PM Status:  Signed    :  Kyle Patel PT (Physical Therapist) DISCHARGE RECOMMENDATIONS  PT Discharge Recommendations: Sub-acute rehabilitation    PLAN  PT Treatment Plan: Bed mobility; Body mechanics; Endurance; Patient education; Family education;Gait training;Neuromuscular re-educate;Range of motion;Strengthening;Stai with the use of RW with platform support on the R UE. Able to dress and change diaper.  Wife assist as needed    SUBJECTIVE  My wife can't help me if I fall, she is not doing well helself    PHYSICAL THERAPY EXAMINATION     OBJECTIVE  Precautions: (right he Transfer training    Patient End of Session: Up in chair;Needs met;Call light within reach;RN aware of session/findings; All patient questions and concerns addressed; Alarm set    [address] to be met by: 12/12/18  Patient Goal Patient's self-stated Patient is a[KR.3 69year old[KR.2] male admitted 12/3/2018 for poor PO intake, general weakness, hx of CVA with right side weakness/increased tone right hand/USHA, UTI .   In this OT evaluation patient presents with the following impairments: fall risk, ba DISCHARGE RECOMMENDATIONS[KR.1]  OT Discharge Recommendations: (MAMADOU)[KR.2]     PLAN[KR.1]  OT Treatment Plan: Balance activities; Energy conservation/work simplification techniques;ADL training;IADL training;Functional transfer training;UE strengthening/ROM Poor food intake/weakness               COGNITION  Alert and O x 2  Impaired safety, alertness, impaired  following multi step directions     VISION      PERCEPTION  Impaired - AFO on right foot   SENSATION  Impaired sensation right hand    Communication: Lower Extremity Dressing: max a[KR.1]     Patient End of Session: Up in chair;Needs met;Call light within reach;RN aware of session/findings; All patient questions and concerns addressed; Alarm set[KR.2]    OT Goals  Patients self stated goal is: go to rehab tsp and open cup. Mastication was functional with adequate oral clearance. Patient appropriately self presenting liquids after solids. Swallow response to palpation appears delayed and likely reduced in coordination.   Throat clear noted x1, however voca No overt clinical signs or symptoms were observed on current diet of NTL via tsp amounts and mechanical soft consistency.   In Progress   Goal #2 The patient/family/caregiver will demonstrate understanding and implementation of aspiration precautions and s

## (undated) NOTE — IP AVS SNAPSHOT
[facility]            (For Outpatient Use Only) Initial Admit Date: 5/6/2019   Inpt/Obs Admit Date: Inpt: 5/7/19 / Obs: N/A   Discharge Date:    Lino Early:  [de-identified]   MRN: [de-identified]   CSN: [id number]   CEID: [id number]        [facility] Account Financial Class: Medicare Advantage    May 11, 2019

## (undated) NOTE — IP AVS SNAPSHOT
Patient Demographics     [facility]  [facility] [place] [place] [address] Phone  [phone] (Home) *Preferred*  [phone] [facility])      Emergency Contact(s)     Name Relation Home Work [address] Spouse [phone]  [address] [place] [address] Next dose due:  5/11 tonight      Take 20 mg by mouth nightly. tamsulosin HCl 0.4 MG Caps  Commonly known as:  FLOMAX  Next dose due:  5/12 morning      Take 0.4 mg by mouth daily.                 Where to Get Your Medications      Please  y [id number] Insulin Aspart Pen (NOVOLOG) 100 UNIT/ML flexpen 1-5 Units 05/10/19 1717 Given            RIGHT UPPER ABDOMEN     Order ID Medication Name Action Time Action Reason Comments    [id number] Heparin Sodium (Porcine) 5000 UNIT/ML injection 5,000 Unit [facility]    Specimen Information    Type Source Collected On   Blood — 05/11/19 0703          Components    Component Value Reference Range [facility] Lab   Glucose 102 70 - 99 mg/dL [facility]   Sodium 145 136 - 145 mmol/L — [facility]   Potassium 4.0 Author:  Jeff Mcelroy MD Service:  Internal Medicine Author Type:  Physician    Filed:  5/7/2019 10:19 AM Date of Service:  5/7/2019 10:11 AM Status:  Signed    :  Jeff Mcelroy MD (Physician)         [facility] Hospitalist H&P       CC: Patient p • No Known Problems Father    • Other (macular degeneration) Mother    • No Known Problems Daughter    • No Known Problems Son    • Diabetes Sister    • No Known Problems Brother        Review of Systems  Comprehensive 10-point ROS reviewed and negative ex 69year old male hx prior CVA, DMII, HTN, DMII who was brought to the ED by family due to concerns for dehydration and weakness.     # UTI  - in setting of indwelling narvaez  - ceftriaxone  - IVF  - follow culture     # USHA  - likely multifactorial from UTI through 5/11/2019  4:15 PM)      Physical Therapy Note signed by Karli Gore PT at 5/11/2019  3:45 PM  Version 2 of 2    Author:   Simeon Ku April, PT Service:  Rehab Author Type:  Physical Therapist    Filed:  5/11/2019  3:45 PM Date of Service:  5/ minimal verbal cues; RLE knee extension 10 x 2 with AAROM with sensory stimulation to quad to improve contraction. Pt left seated in chair, all needs in place, RN aware.       The patient's Approx Degree of Impairment: 72.57% has been calculated based on do How much help from another person does the patient currently need. ..   -   Moving to and from a bed to a chair (including a wheelchair)?: A Lot   -   Need to walk in hospital room?: A Lot   -   Climbing 3-5 steps with a railing?: Total     AM-PAC Score:  R Current Status      [AO.1]         Attribution [address]    AO. 1 Odalis Gongora, April, PT on 5/11/2019  3:34 PM               Physical Therapy Note signed by Marleni Cook PT at 5/11/2019  3:45 PM  Version 1 of 2    Author:   Sundeep Martinez, April, PT Service:  Miri Zarate performed PROM and stretching of R hand to reduce flexion contracture. PT implemented seated exercises.  Pt performed LLE exercises 10 x 2 with minimal verbal cues; RLE knee extension 10 x 2 with AAROM with sensory stimulation to quad to improve contraction wheelchair, bedside commode, etc.): A Lot   -   Moving from lying on back to sitting on the side of the bed?: A Lot   How much help from another person does the patient currently need. ..   -   Moving to and from a bed to a chair (including a wheelchair)?: instructions provided to patient in preparation for discharge. Goal #5   Current Status  in progress   Goal #6     Goal #6  Current Status      [AO.1]         Attribution [address]    AO. 1 Rosy Jung, PT on 5/11/2019  3:34 PM               Physical The PT Discharge Recommendations: Sub-acute rehabilitation(w/ transition to long term care)[TF.2] if wife is unable to assist his needs at home[TF.4]    PLAN[TF.1]  PT Treatment Plan: Bed mobility;Transfer training; Body mechanics; Coordination; Endurance; Energy Position[TF.1] Sitting[TF.4]       Patient End of Session: In bed;Needs met;Call light within reach;RN aware of session/findings; All patient questions and concerns addressed;SCDs in place; Alarm set[TF.5]    CURRENT GOALS[TF.1]   Goals to be met by: 5/21/19 Principal Problem:    USHA (acute kidney injury) ([facility] [address]  Active Problems:    Acute cystitis without hematuria[EN.2]      OCCUPATIONAL THERAPY ASSESSMENT     RN okayed OT to see pt this date. PT/OT cotx at this time.  Pt received supine in bed and was motivate ACTIVITIES OF DAILY LIVING ASSESSMENT  AM-PAC ‘6-Clicks’ Inpatient Daily Activity Short Form  How much help from another person does the patient currently need…[EN.1]  -   Putting on and taking off regular lower body clothing?: Total  -   Bathing (includin Author:  WHIT Caba Service:  Rosalinda Vu Type:  SPEECH-LANGUAGE PATHOLOGIST    Filed:  5/9/2019  1:37 PM Date of Service:  5/9/2019  1:13 PM Status:  Signed    :  WHIT Caba (SPEECH-LANGUAGE PATHOLOGIST)       SPEECH DAILY NO Treatment Plan  Treatment Plan/Recommendations: Aspiration precautions    Interdisciplinary Communication: Discussed with RN      GOALS  Goal #1 The patient will tolerate mechanical soft (chopped) consistency and nectar liquids without overt signs or sympt